# Patient Record
Sex: MALE | Race: WHITE | NOT HISPANIC OR LATINO | Employment: FULL TIME | URBAN - METROPOLITAN AREA
[De-identification: names, ages, dates, MRNs, and addresses within clinical notes are randomized per-mention and may not be internally consistent; named-entity substitution may affect disease eponyms.]

---

## 2019-01-05 ENCOUNTER — CLINICAL SUPPORT (OUTPATIENT)
Dept: URGENT CARE | Facility: CLINIC | Age: 34
End: 2019-01-05
Payer: COMMERCIAL

## 2019-01-05 VITALS
TEMPERATURE: 98 F | OXYGEN SATURATION: 97 % | HEART RATE: 71 BPM | SYSTOLIC BLOOD PRESSURE: 143 MMHG | WEIGHT: 192.38 LBS | BODY MASS INDEX: 29.16 KG/M2 | RESPIRATION RATE: 16 BRPM | HEIGHT: 68 IN | DIASTOLIC BLOOD PRESSURE: 77 MMHG

## 2019-01-05 DIAGNOSIS — H92.02 LEFT EAR PAIN: ICD-10-CM

## 2019-01-05 DIAGNOSIS — H65.92 OME (OTITIS MEDIA WITH EFFUSION), LEFT: Primary | ICD-10-CM

## 2019-01-05 PROCEDURE — 96372 PR INJECTION,THERAP/PROPH/DIAG2ST, IM OR SUBCUT: ICD-10-PCS | Mod: S$GLB,,, | Performed by: NURSE PRACTITIONER

## 2019-01-05 PROCEDURE — 99204 PR OFFICE/OUTPT VISIT, NEW, LEVL IV, 45-59 MIN: ICD-10-PCS | Mod: 25,S$GLB,, | Performed by: NURSE PRACTITIONER

## 2019-01-05 PROCEDURE — 96372 THER/PROPH/DIAG INJ SC/IM: CPT | Mod: S$GLB,,, | Performed by: NURSE PRACTITIONER

## 2019-01-05 PROCEDURE — 99204 OFFICE O/P NEW MOD 45 MIN: CPT | Mod: 25,S$GLB,, | Performed by: NURSE PRACTITIONER

## 2019-01-05 RX ORDER — OMEPRAZOLE 10 MG/1
10 CAPSULE, DELAYED RELEASE ORAL DAILY
COMMUNITY
End: 2020-01-30 | Stop reason: ALTCHOICE

## 2019-01-05 RX ORDER — DEXAMETHASONE SODIUM PHOSPHATE 4 MG/ML
8 INJECTION, SOLUTION INTRA-ARTICULAR; INTRALESIONAL; INTRAMUSCULAR; INTRAVENOUS; SOFT TISSUE
Status: COMPLETED | OUTPATIENT
Start: 2019-01-05 | End: 2019-01-05

## 2019-01-05 RX ORDER — ENALAPRIL MALEATE 20 MG/1
20 TABLET ORAL 2 TIMES DAILY
Status: ON HOLD | COMMUNITY
End: 2020-01-31 | Stop reason: SDUPTHER

## 2019-01-05 RX ORDER — HYDROCODONE BITARTRATE AND ACETAMINOPHEN 5; 325 MG/1; MG/1
1 TABLET ORAL EVERY 6 HOURS PRN
Qty: 15 TABLET | Refills: 0 | Status: SHIPPED | OUTPATIENT
Start: 2019-01-05 | End: 2020-01-30

## 2019-01-05 RX ORDER — DEXTROAMPHETAMINE SACCHARATE, AMPHETAMINE ASPARTATE MONOHYDRATE, DEXTROAMPHETAMINE SULFATE AND AMPHETAMINE SULFATE 5; 5; 5; 5 MG/1; MG/1; MG/1; MG/1
20 CAPSULE, EXTENDED RELEASE ORAL EVERY MORNING
COMMUNITY
End: 2020-01-30

## 2019-01-05 RX ORDER — AMOXICILLIN 875 MG/1
875 TABLET, FILM COATED ORAL 2 TIMES DAILY
Qty: 20 TABLET | Refills: 0 | Status: SHIPPED | OUTPATIENT
Start: 2019-01-05 | End: 2019-01-15

## 2019-01-05 RX ADMIN — DEXAMETHASONE SODIUM PHOSPHATE 8 MG: 4 INJECTION, SOLUTION INTRA-ARTICULAR; INTRALESIONAL; INTRAMUSCULAR; INTRAVENOUS; SOFT TISSUE at 12:01

## 2019-01-05 NOTE — PATIENT INSTRUCTIONS
Middle Ear Infection (Adult)  You have an infection of the middle ear, the space behind the eardrum. This is also called acute otitis media (AOM). Sometimes it is caused by the common cold. This is because congestion can block the internal passage (eustachian tube) that drains fluid from the middle ear. When the middle ear fills with fluid, bacteria can grow there and cause an infection. Oral antibiotics are used to treat this illness, not ear drops. Symptoms usually start to improve within 1 to 2 days of treatment.    Home care  The following are general care guidelines:  · Finish all of the antibiotic medicine given, even though you may feel better after the first few days.  · You may use over-the-counter medicine, such as acetaminophen or ibuprofen, to control pain and fever, unless something else was prescribed. If you have chronic liver or kidney disease or have ever had a stomach ulcer or gastrointestinal bleeding, talk with your healthcare provider before using these medicines. Do not give aspirin to anyone under 18 years of age who has a fever. It may cause severe illness or death.  Follow-up care  Follow up with your healthcare provider, or as advised, in 2 weeks if all symptoms have not gotten better, or if hearing doesn't go back to normal within 1 month.  When to seek medical advice  Call your healthcare provider right away if any of these occur:  · Ear pain gets worse or does not improve after 3 days of treatment  · Unusual drowsiness or confusion  · Neck pain, stiff neck, or headache  · Fluid or blood draining from the ear canal  · Fever of 100.4°F (38°C) or as advised   · Seizure  Date Last Reviewed: 6/1/2016  © [id number] [employer]. [address] [place] [address], [place] [address]. All rights reserved. This information is not intended as a substitute for professional medical care. Always follow your healthcare professional's instructions.

## 2019-01-05 NOTE — PROGRESS NOTES
"Subjective:       Patient ID: Trace Bird III is a 33 y.o. male.    Vitals:  height is 5' 8" (1.727 m) and weight is 87.3 kg (192 lb 6.4 oz). His temperature is 98 °F (36.7 °C). His blood pressure is 143/77 (abnormal) and his pulse is 71. His respiration is 16 and oxygen saturation is 97%.     Chief Complaint: Otalgia    Pt presents with left ear pain x 1 day. Pt describes pain as severe intensity, throbbing quality, constant timing, no alleviating factors. Pt denies f/c/n/v.      Otalgia    There is pain in the left ear. This is a new problem. The current episode started yesterday. The problem occurs constantly. There has been no fever. The pain is at a severity of 7/10. The pain is moderate. Pertinent negatives include no coughing, ear discharge, headaches, rash, sore throat or vomiting. He has tried NSAIDs for the symptoms. The treatment provided mild relief.       Constitution: Negative for chills, sweating, fatigue and fever.   HENT: Positive for ear pain. Negative for ear discharge, congestion, sinus pain, sinus pressure, sore throat and voice change.    Neck: Negative for painful lymph nodes.   Eyes: Negative for eye redness.   Respiratory: Negative for chest tightness, cough, sputum production, bloody sputum, COPD, shortness of breath, stridor, wheezing and asthma.    Gastrointestinal: Negative for nausea and vomiting.   Musculoskeletal: Negative for muscle ache.   Skin: Negative for rash.   Allergic/Immunologic: Negative for seasonal allergies and asthma.   Neurological: Negative for headaches.   Hematologic/Lymphatic: Negative for swollen lymph nodes.       Objective:      Physical Exam   Constitutional: He is oriented to person, place, and time. He appears well-developed and well-nourished. He is cooperative.  Non-toxic appearance. He does not appear ill. No distress.   HENT:   Head: Normocephalic and atraumatic.   Right Ear: Hearing, tympanic membrane, external ear and ear canal normal.   Left Ear: " Hearing, external ear and ear canal normal. Tympanic membrane is erythematous and bulging.   Nose: Nose normal. No mucosal edema, rhinorrhea or nasal deformity. No epistaxis. Right sinus exhibits no maxillary sinus tenderness and no frontal sinus tenderness. Left sinus exhibits no maxillary sinus tenderness and no frontal sinus tenderness.   Mouth/Throat: Uvula is midline, oropharynx is clear and moist and mucous membranes are normal. No trismus in the jaw. Normal dentition. No uvula swelling. No posterior oropharyngeal erythema.   Eyes: Conjunctivae and lids are normal. No scleral icterus.   Sclera clear bilat   Neck: Trachea normal, full passive range of motion without pain and phonation normal. Neck supple.   Cardiovascular: Normal rate, regular rhythm, normal heart sounds, intact distal pulses and normal pulses.   Pulmonary/Chest: Effort normal and breath sounds normal. No respiratory distress.   Abdominal: Soft. Normal appearance and bowel sounds are normal. He exhibits no distension. There is no tenderness.   Musculoskeletal: Normal range of motion. He exhibits no edema or deformity.   Neurological: He is alert and oriented to person, place, and time. He exhibits normal muscle tone. Coordination normal.   Skin: Skin is warm, dry and intact. He is not diaphoretic. No pallor.   Psychiatric: He has a normal mood and affect. His speech is normal and behavior is normal. Judgment and thought content normal. Cognition and memory are normal.   Nursing note and vitals reviewed.      Assessment:       1. OME (otitis media with effusion), left    2. Left ear pain        Plan:         OME (otitis media with effusion), left    Left ear pain    Other orders  -     amoxicillin (AMOXIL) 875 MG tablet; Take 1 tablet (875 mg total) by mouth 2 (two) times daily. for 10 days  Dispense: 20 tablet; Refill: 0  -     dexamethasone injection 8 mg  -     HYDROcodone-acetaminophen (NORCO) 5-325 mg per tablet; Take 1 tablet by mouth  every 6 (six) hours as needed.  Dispense: 15 tablet; Refill: 0

## 2020-01-30 ENCOUNTER — HOSPITAL ENCOUNTER (INPATIENT)
Facility: HOSPITAL | Age: 35
LOS: 1 days | Discharge: HOME | DRG: 247 | End: 2020-01-31
Attending: EMERGENCY MEDICINE | Admitting: INTERNAL MEDICINE
Payer: COMMERCIAL

## 2020-01-30 ENCOUNTER — CLINICAL SUPPORT (OUTPATIENT)
Dept: CARDIOLOGY | Facility: HOSPITAL | Age: 35
DRG: 247 | End: 2020-01-30
Attending: INTERNAL MEDICINE
Payer: COMMERCIAL

## 2020-01-30 DIAGNOSIS — R07.9 CHEST PAIN: ICD-10-CM

## 2020-01-30 DIAGNOSIS — I21.4 NSTEMI (NON-ST ELEVATED MYOCARDIAL INFARCTION): Primary | ICD-10-CM

## 2020-01-30 DIAGNOSIS — R74.01 TRANSAMINITIS: ICD-10-CM

## 2020-01-30 DIAGNOSIS — I25.10 CAD (CORONARY ARTERY DISEASE): ICD-10-CM

## 2020-01-30 PROBLEM — F19.10 SUBSTANCE ABUSE: Chronic | Status: ACTIVE | Noted: 2020-01-30

## 2020-01-30 PROBLEM — I10 ESSENTIAL HYPERTENSION: Chronic | Status: ACTIVE | Noted: 2020-01-30

## 2020-01-30 PROBLEM — K21.9 GERD (GASTROESOPHAGEAL REFLUX DISEASE): Chronic | Status: ACTIVE | Noted: 2020-01-30

## 2020-01-30 PROBLEM — Z72.0 NICOTINE ABUSE: Chronic | Status: ACTIVE | Noted: 2020-01-30

## 2020-01-30 PROBLEM — R79.89 ELEVATED TROPONIN: Status: ACTIVE | Noted: 2020-01-30

## 2020-01-30 PROBLEM — F10.10 ALCOHOL ABUSE: Chronic | Status: ACTIVE | Noted: 2020-01-30

## 2020-01-30 LAB
ALBUMIN SERPL BCP-MCNC: 4 G/DL (ref 3.5–5.2)
ALP SERPL-CCNC: 44 U/L (ref 55–135)
ALT SERPL W/O P-5'-P-CCNC: 94 U/L (ref 10–44)
ANION GAP SERPL CALC-SCNC: 10 MMOL/L (ref 8–16)
AST SERPL-CCNC: 42 U/L (ref 10–40)
BASOPHILS # BLD AUTO: 0.08 K/UL (ref 0–0.2)
BASOPHILS NFR BLD: 0.9 % (ref 0–1.9)
BILIRUB SERPL-MCNC: 0.6 MG/DL (ref 0.1–1)
BNP SERPL-MCNC: 17 PG/ML (ref 0–99)
BUN SERPL-MCNC: 16 MG/DL (ref 6–20)
CALCIUM SERPL-MCNC: 9.2 MG/DL (ref 8.7–10.5)
CHLORIDE SERPL-SCNC: 105 MMOL/L (ref 95–110)
CK SERPL-CCNC: 183 U/L (ref 20–200)
CO2 SERPL-SCNC: 24 MMOL/L (ref 23–29)
CREAT SERPL-MCNC: 1.1 MG/DL (ref 0.5–1.4)
D DIMER PPP IA.FEU-MCNC: 0.52 UG/ML FEU
DIFFERENTIAL METHOD: ABNORMAL
EOSINOPHIL # BLD AUTO: 0.2 K/UL (ref 0–0.5)
EOSINOPHIL NFR BLD: 2.4 % (ref 0–8)
ERYTHROCYTE [DISTWIDTH] IN BLOOD BY AUTOMATED COUNT: 12.2 % (ref 11.5–14.5)
EST. GFR  (AFRICAN AMERICAN): >60 ML/MIN/1.73 M^2
EST. GFR  (NON AFRICAN AMERICAN): >60 ML/MIN/1.73 M^2
GLUCOSE SERPL-MCNC: 94 MG/DL (ref 70–110)
HCT VFR BLD AUTO: 46.7 % (ref 40–54)
HGB BLD-MCNC: 16.2 G/DL (ref 14–18)
IMM GRANULOCYTES # BLD AUTO: 0.03 K/UL (ref 0–0.04)
IMM GRANULOCYTES NFR BLD AUTO: 0.3 % (ref 0–0.5)
LYMPHOCYTES # BLD AUTO: 4.3 K/UL (ref 1–4.8)
LYMPHOCYTES NFR BLD: 47.1 % (ref 18–48)
MCH RBC QN AUTO: 31 PG (ref 27–31)
MCHC RBC AUTO-ENTMCNC: 34.7 G/DL (ref 32–36)
MCV RBC AUTO: 89 FL (ref 82–98)
MONOCYTES # BLD AUTO: 1.4 K/UL (ref 0.3–1)
MONOCYTES NFR BLD: 15.9 % (ref 4–15)
NEUTROPHILS # BLD AUTO: 3 K/UL (ref 1.8–7.7)
NEUTROPHILS NFR BLD: 33.4 % (ref 38–73)
NRBC BLD-RTO: 0 /100 WBC
PLATELET # BLD AUTO: 284 K/UL (ref 150–350)
PMV BLD AUTO: 9.7 FL (ref 9.2–12.9)
POC ACTIVATED CLOTTING TIME K: 169 SEC (ref 74–137)
POTASSIUM SERPL-SCNC: 3.8 MMOL/L (ref 3.5–5.1)
PROT SERPL-MCNC: 6.8 G/DL (ref 6–8.4)
RBC # BLD AUTO: 5.23 M/UL (ref 4.6–6.2)
SAMPLE: ABNORMAL
SODIUM SERPL-SCNC: 139 MMOL/L (ref 136–145)
TROPONIN I SERPL DL<=0.01 NG/ML-MCNC: 0.14 NG/ML
TROPONIN I SERPL DL<=0.01 NG/ML-MCNC: 0.15 NG/ML
TROPONIN I SERPL DL<=0.01 NG/ML-MCNC: 0.19 NG/ML
TROPONIN I SERPL DL<=0.01 NG/ML-MCNC: 0.32 NG/ML
TROPONIN I SERPL DL<=0.01 NG/ML-MCNC: 0.33 NG/ML
WBC # BLD AUTO: 9.08 K/UL (ref 3.9–12.7)

## 2020-01-30 PROCEDURE — C9606 PERC D-E COR REVASC W AMI S: HCPCS | Mod: LD | Performed by: INTERNAL MEDICINE

## 2020-01-30 PROCEDURE — C1887 CATHETER, GUIDING: HCPCS | Performed by: INTERNAL MEDICINE

## 2020-01-30 PROCEDURE — 63600175 PHARM REV CODE 636 W HCPCS: Performed by: INTERNAL MEDICINE

## 2020-01-30 PROCEDURE — 93005 ELECTROCARDIOGRAM TRACING: CPT

## 2020-01-30 PROCEDURE — 25000003 PHARM REV CODE 250: Performed by: STUDENT IN AN ORGANIZED HEALTH CARE EDUCATION/TRAINING PROGRAM

## 2020-01-30 PROCEDURE — 85379 FIBRIN DEGRADATION QUANT: CPT

## 2020-01-30 PROCEDURE — 99152 MOD SED SAME PHYS/QHP 5/>YRS: CPT | Performed by: INTERNAL MEDICINE

## 2020-01-30 PROCEDURE — 25000003 PHARM REV CODE 250: Performed by: INTERNAL MEDICINE

## 2020-01-30 PROCEDURE — 84484 ASSAY OF TROPONIN QUANT: CPT | Mod: 91

## 2020-01-30 PROCEDURE — 80053 COMPREHEN METABOLIC PANEL: CPT

## 2020-01-30 PROCEDURE — 85025 COMPLETE CBC W/AUTO DIFF WBC: CPT

## 2020-01-30 PROCEDURE — 99900035 HC TECH TIME PER 15 MIN (STAT)

## 2020-01-30 PROCEDURE — 99153 MOD SED SAME PHYS/QHP EA: CPT | Performed by: INTERNAL MEDICINE

## 2020-01-30 PROCEDURE — 93306 TTE W/DOPPLER COMPLETE: CPT

## 2020-01-30 PROCEDURE — C1894 INTRO/SHEATH, NON-LASER: HCPCS | Performed by: INTERNAL MEDICINE

## 2020-01-30 PROCEDURE — C1769 GUIDE WIRE: HCPCS | Performed by: INTERNAL MEDICINE

## 2020-01-30 PROCEDURE — C1874 STENT, COATED/COV W/DEL SYS: HCPCS | Performed by: INTERNAL MEDICINE

## 2020-01-30 PROCEDURE — 63600175 PHARM REV CODE 636 W HCPCS: Performed by: STUDENT IN AN ORGANIZED HEALTH CARE EDUCATION/TRAINING PROGRAM

## 2020-01-30 PROCEDURE — 25500020 PHARM REV CODE 255: Performed by: EMERGENCY MEDICINE

## 2020-01-30 PROCEDURE — 25500020 PHARM REV CODE 255: Performed by: INTERNAL MEDICINE

## 2020-01-30 PROCEDURE — C9113 INJ PANTOPRAZOLE SODIUM, VIA: HCPCS | Performed by: INTERNAL MEDICINE

## 2020-01-30 PROCEDURE — 83880 ASSAY OF NATRIURETIC PEPTIDE: CPT

## 2020-01-30 PROCEDURE — 93458 L HRT ARTERY/VENTRICLE ANGIO: CPT | Mod: XU | Performed by: INTERNAL MEDICINE

## 2020-01-30 PROCEDURE — 27800903 OPTIME MED/SURG SUP & DEVICES OTHER IMPLANTS: Performed by: INTERNAL MEDICINE

## 2020-01-30 PROCEDURE — 94761 N-INVAS EAR/PLS OXIMETRY MLT: CPT

## 2020-01-30 PROCEDURE — 21000000 HC CCU ICU ROOM CHARGE

## 2020-01-30 PROCEDURE — 36415 COLL VENOUS BLD VENIPUNCTURE: CPT

## 2020-01-30 PROCEDURE — 99285 EMERGENCY DEPT VISIT HI MDM: CPT | Mod: 25

## 2020-01-30 PROCEDURE — 84484 ASSAY OF TROPONIN QUANT: CPT

## 2020-01-30 PROCEDURE — 82550 ASSAY OF CK (CPK): CPT

## 2020-01-30 DEVICE — STENT RONYX40015UX RESOLUTE ONYX 4.00X15
Type: IMPLANTABLE DEVICE | Site: HEART | Status: FUNCTIONAL
Brand: RESOLUTE ONYX™

## 2020-01-30 RX ORDER — ENOXAPARIN SODIUM 100 MG/ML
80 INJECTION SUBCUTANEOUS ONCE
Status: COMPLETED | OUTPATIENT
Start: 2020-01-30 | End: 2020-01-30

## 2020-01-30 RX ORDER — FENTANYL CITRATE 50 UG/ML
INJECTION, SOLUTION INTRAMUSCULAR; INTRAVENOUS
Status: DISCONTINUED | OUTPATIENT
Start: 2020-01-30 | End: 2020-01-30 | Stop reason: HOSPADM

## 2020-01-30 RX ORDER — NITROGLYCERIN 0.4 MG/1
0.4 TABLET SUBLINGUAL ONCE
Status: COMPLETED | OUTPATIENT
Start: 2020-01-30 | End: 2020-01-30

## 2020-01-30 RX ORDER — ASPIRIN 325 MG
325 TABLET ORAL
Status: COMPLETED | OUTPATIENT
Start: 2020-01-30 | End: 2020-01-30

## 2020-01-30 RX ORDER — ONDANSETRON 2 MG/ML
INJECTION INTRAMUSCULAR; INTRAVENOUS
Status: DISCONTINUED | OUTPATIENT
Start: 2020-01-30 | End: 2020-01-30 | Stop reason: HOSPADM

## 2020-01-30 RX ORDER — IBUPROFEN 800 MG/1
800 TABLET ORAL 3 TIMES DAILY PRN
Status: ON HOLD | COMMUNITY
End: 2020-01-31 | Stop reason: HOSPADM

## 2020-01-30 RX ORDER — ASPIRIN 81 MG/1
81 TABLET ORAL DAILY
Status: DISCONTINUED | OUTPATIENT
Start: 2020-01-31 | End: 2020-01-31 | Stop reason: HOSPADM

## 2020-01-30 RX ORDER — NITROGLYCERIN 20 MG/100ML
INJECTION INTRAVENOUS
Status: DISCONTINUED | OUTPATIENT
Start: 2020-01-30 | End: 2020-01-30

## 2020-01-30 RX ORDER — ESOMEPRAZOLE MAGNESIUM 40 MG/1
40 CAPSULE, DELAYED RELEASE ORAL
COMMUNITY

## 2020-01-30 RX ORDER — MORPHINE SULFATE 2 MG/ML
2 INJECTION, SOLUTION INTRAMUSCULAR; INTRAVENOUS
Status: DISCONTINUED | OUTPATIENT
Start: 2020-01-30 | End: 2020-01-31 | Stop reason: HOSPADM

## 2020-01-30 RX ORDER — NITROGLYCERIN 20 MG/100ML
5 INJECTION INTRAVENOUS CONTINUOUS
Status: DISCONTINUED | OUTPATIENT
Start: 2020-01-30 | End: 2020-01-31 | Stop reason: HOSPADM

## 2020-01-30 RX ORDER — HYDROCODONE BITARTRATE AND ACETAMINOPHEN 5; 325 MG/1; MG/1
1 TABLET ORAL EVERY 4 HOURS PRN
Status: DISCONTINUED | OUTPATIENT
Start: 2020-01-30 | End: 2020-01-31 | Stop reason: HOSPADM

## 2020-01-30 RX ORDER — NITROGLYCERIN 0.4 MG/1
0.4 TABLET SUBLINGUAL EVERY 5 MIN PRN
Status: DISCONTINUED | OUTPATIENT
Start: 2020-01-30 | End: 2020-01-31 | Stop reason: HOSPADM

## 2020-01-30 RX ORDER — PANTOPRAZOLE SODIUM 40 MG/1
40 TABLET, DELAYED RELEASE ORAL DAILY
Status: DISCONTINUED | OUTPATIENT
Start: 2020-01-30 | End: 2020-01-30

## 2020-01-30 RX ORDER — LIDOCAINE HYDROCHLORIDE 10 MG/ML
INJECTION, SOLUTION EPIDURAL; INFILTRATION; INTRACAUDAL; PERINEURAL
Status: DISCONTINUED | OUTPATIENT
Start: 2020-01-30 | End: 2020-01-30 | Stop reason: HOSPADM

## 2020-01-30 RX ORDER — SODIUM CHLORIDE 9 MG/ML
INJECTION, SOLUTION INTRAVENOUS CONTINUOUS
Status: DISCONTINUED | OUTPATIENT
Start: 2020-01-30 | End: 2020-01-31 | Stop reason: HOSPADM

## 2020-01-30 RX ORDER — ONDANSETRON 2 MG/ML
4 INJECTION INTRAMUSCULAR; INTRAVENOUS EVERY 8 HOURS PRN
Status: DISCONTINUED | OUTPATIENT
Start: 2020-01-30 | End: 2020-01-31 | Stop reason: HOSPADM

## 2020-01-30 RX ORDER — VERAPAMIL HYDROCHLORIDE 2.5 MG/ML
INJECTION, SOLUTION INTRAVENOUS
Status: DISCONTINUED | OUTPATIENT
Start: 2020-01-30 | End: 2020-01-30 | Stop reason: HOSPADM

## 2020-01-30 RX ORDER — NITROGLYCERIN 5 MG/ML
INJECTION, SOLUTION INTRAVENOUS
Status: DISCONTINUED | OUTPATIENT
Start: 2020-01-30 | End: 2020-01-30 | Stop reason: HOSPADM

## 2020-01-30 RX ORDER — HEPARIN SODIUM 1000 [USP'U]/ML
INJECTION, SOLUTION INTRAVENOUS; SUBCUTANEOUS
Status: DISCONTINUED | OUTPATIENT
Start: 2020-01-30 | End: 2020-01-30 | Stop reason: HOSPADM

## 2020-01-30 RX ORDER — ACETAMINOPHEN 325 MG/1
650 TABLET ORAL EVERY 4 HOURS PRN
Status: DISCONTINUED | OUTPATIENT
Start: 2020-01-30 | End: 2020-01-31 | Stop reason: HOSPADM

## 2020-01-30 RX ORDER — SODIUM CHLORIDE 0.9 % (FLUSH) 0.9 %
10 SYRINGE (ML) INJECTION EVERY 6 HOURS PRN
Status: DISCONTINUED | OUTPATIENT
Start: 2020-01-30 | End: 2020-01-31 | Stop reason: HOSPADM

## 2020-01-30 RX ORDER — ATORVASTATIN CALCIUM 40 MG/1
80 TABLET, FILM COATED ORAL DAILY
Status: DISCONTINUED | OUTPATIENT
Start: 2020-01-30 | End: 2020-01-31 | Stop reason: HOSPADM

## 2020-01-30 RX ORDER — DIPHENHYDRAMINE HCL 25 MG
25 CAPSULE ORAL ONCE
Status: DISCONTINUED | OUTPATIENT
Start: 2020-01-30 | End: 2020-01-31 | Stop reason: HOSPADM

## 2020-01-30 RX ORDER — POLYETHYLENE GLYCOL 3350 17 G/17G
17 POWDER, FOR SOLUTION ORAL DAILY
Status: DISCONTINUED | OUTPATIENT
Start: 2020-01-30 | End: 2020-01-31 | Stop reason: HOSPADM

## 2020-01-30 RX ORDER — HYDROMORPHONE HYDROCHLORIDE 1 MG/ML
INJECTION, SOLUTION INTRAMUSCULAR; INTRAVENOUS; SUBCUTANEOUS
Status: DISCONTINUED | OUTPATIENT
Start: 2020-01-30 | End: 2020-01-30 | Stop reason: HOSPADM

## 2020-01-30 RX ORDER — ASPIRIN 325 MG
325 TABLET, DELAYED RELEASE (ENTERIC COATED) ORAL ONCE
Status: DISCONTINUED | OUTPATIENT
Start: 2020-01-30 | End: 2020-01-31 | Stop reason: HOSPADM

## 2020-01-30 RX ORDER — ENALAPRIL MALEATE 5 MG/1
20 TABLET ORAL DAILY
Status: DISCONTINUED | OUTPATIENT
Start: 2020-01-30 | End: 2020-01-31 | Stop reason: HOSPADM

## 2020-01-30 RX ORDER — MIDAZOLAM HYDROCHLORIDE 1 MG/ML
INJECTION INTRAMUSCULAR; INTRAVENOUS
Status: DISCONTINUED | OUTPATIENT
Start: 2020-01-30 | End: 2020-01-30 | Stop reason: HOSPADM

## 2020-01-30 RX ORDER — PANTOPRAZOLE SODIUM 40 MG/10ML
40 INJECTION, POWDER, LYOPHILIZED, FOR SOLUTION INTRAVENOUS DAILY
Status: DISCONTINUED | OUTPATIENT
Start: 2020-01-30 | End: 2020-01-31 | Stop reason: HOSPADM

## 2020-01-30 RX ADMIN — ENOXAPARIN SODIUM 80 MG: 80 INJECTION, SOLUTION INTRAVENOUS; SUBCUTANEOUS at 08:01

## 2020-01-30 RX ADMIN — ENALAPRIL MALEATE 20 MG: 5 TABLET ORAL at 10:01

## 2020-01-30 RX ADMIN — ACETAMINOPHEN 650 MG: 325 TABLET ORAL at 10:01

## 2020-01-30 RX ADMIN — NITROGLYCERIN 1 INCH: 20 OINTMENT TOPICAL at 04:01

## 2020-01-30 RX ADMIN — NITROGLYCERIN 0.4 MG: 0.4 TABLET, ORALLY DISINTEGRATING SUBLINGUAL at 01:01

## 2020-01-30 RX ADMIN — NITROGLYCERIN 0.4 MG: 0.4 TABLET SUBLINGUAL at 02:01

## 2020-01-30 RX ADMIN — PANTOPRAZOLE SODIUM 40 MG: 40 INJECTION, POWDER, FOR SOLUTION INTRAVENOUS at 01:01

## 2020-01-30 RX ADMIN — IOHEXOL 100 ML: 350 INJECTION, SOLUTION INTRAVENOUS at 05:01

## 2020-01-30 RX ADMIN — SODIUM CHLORIDE: 0.9 INJECTION, SOLUTION INTRAVENOUS at 06:01

## 2020-01-30 RX ADMIN — ATORVASTATIN CALCIUM 80 MG: 40 TABLET, FILM COATED ORAL at 09:01

## 2020-01-30 RX ADMIN — ASPIRIN 325 MG ORAL TABLET 325 MG: 325 PILL ORAL at 01:01

## 2020-01-30 NOTE — ASSESSMENT & PLAN NOTE
Patient presenting with substernal nonradiating chest pain, acute worsening.  Risk factors include smoking history as well as hypertension.  No prior cardiac evaluation.  EKG without any ST elevations.  Troponin has up trended to 0.315.  No evidence of pneumonia, elevated D-dimer however negative CTA ruling out PE.  Admit inpatient, cardiac telemetry  Aspirin 81 mg daily, Lipitor 80 mg q.h.s.  Hold off beta-blocker until urine drug screen returns- I have ordered  Add on CPK to labs  Check lipid panel and HbA1c  Complete echocardiogram ordered  Sublingual nitro p.r.n. recurrent chest pain  One time dose therapeutic Lovenox  Consult Cardiology

## 2020-01-30 NOTE — ASSESSMENT & PLAN NOTE
Patient reports history of essential hypertension on enalapril 20 mg daily and states he was compliant.  Continue enalapril and monitor blood pressure.

## 2020-01-30 NOTE — ED NOTES
Patient is resting comfortably. Family at bedside. Chest pressure remains same after nitro, c/o mild  Headache at this time.

## 2020-01-30 NOTE — ED PROVIDER NOTES
Encounter Date: 1/30/2020       History     Chief Complaint   Patient presents with    Chest Pain     pt c/o midsternal chest pain lasting 20 minutes that started earlier tonight pt denies SOB. N/V/D     HPI     34-year-old male with past medical history significant for hypertension, GERD who presents to the emergency department for chest pain. Patient states this chest pain began approximately 20 min prior to arrival, states the pain is substernal, states that it radiated to his bilateral arms, states that it lasted for approximately 30 min and went away on its own.  States he does still have some chest tightness associated with this.  States that he has been having this daily for the past month, and that it always goes away on its own.  Does occasionally have it during the day, but does frequently get it at night as well. Does have a family history of heart disease, though his mother had her MI in her 50s.  Patient states that he did have a prior substance abuse history, though has not used alcohol or medications since the beginning of this month.  Denies any worsening with exertion, denies any recent travel, denies any hemoptysis, no unilateral leg swelling, no recent surgery.    Review of patient's allergies indicates:  No Known Allergies  Past Medical History:   Diagnosis Date    GERD (gastroesophageal reflux disease)     Helicobacter pylori (H. pylori)     Hypertension      Past Surgical History:   Procedure Laterality Date    HERNIA REPAIR       Family History   Problem Relation Age of Onset    Hypertension Mother     Diabetes Father     Hypertension Father      Social History     Tobacco Use    Smoking status: Never Smoker   Substance Use Topics    Alcohol use: Yes     Comment: Patient drinks socially    Drug use: No     Review of Systems   Constitutional: Negative for chills and fever.   HENT: Negative for drooling and tinnitus.    Eyes: Negative for photophobia and visual disturbance.    Respiratory: Positive for chest tightness. Negative for shortness of breath and wheezing.    Cardiovascular: Positive for chest pain. Negative for palpitations.   Gastrointestinal: Negative for abdominal pain, diarrhea, nausea and vomiting.   Genitourinary: Negative for flank pain and hematuria.   Musculoskeletal: Negative for neck pain and neck stiffness.   Skin: Negative for color change and pallor.   Neurological: Negative for light-headedness and numbness.       Physical Exam     Initial Vitals [01/30/20 0115]   BP Pulse Resp Temp SpO2   139/88 78 18 97.9 °F (36.6 °C) 99 %      MAP       --         Physical Exam    Nursing note and vitals reviewed.  Constitutional: He appears well-developed and well-nourished. No distress.   Nontoxic, sitting comfortably in bed   HENT:   Head: Normocephalic and atraumatic.   Mouth/Throat: No oropharyngeal exudate.   Eyes: EOM are normal. Pupils are equal, round, and reactive to light. No scleral icterus.   Neck: Normal range of motion. Neck supple. No tracheal deviation present.   Cardiovascular: Normal rate and regular rhythm. Exam reveals no gallop and no friction rub.    No murmur heard.  Radials 2+ bilaterally, no reproducible chest wall pain   Pulmonary/Chest: Breath sounds normal. No respiratory distress. He has no wheezes. He has no rhonchi. He has no rales.   Abdominal: Soft. He exhibits no distension. There is no tenderness. There is no rebound and no guarding.   Negative Walter, negative Rovsing, no tenderness over McBurney's   Musculoskeletal: Normal range of motion.   Neurological: He is alert and oriented to person, place, and time. No cranial nerve deficit.   Skin: Skin is warm and dry.         ED Course   Procedures  Labs Reviewed   CBC W/ AUTO DIFFERENTIAL   COMPREHENSIVE METABOLIC PANEL   TROPONIN I   TROPONIN I   B-TYPE NATRIURETIC PEPTIDE   D DIMER, QUANTITATIVE          Imaging Results          X-Ray Chest PA And Lateral (In process)                   Medical Decision Making:   Initial Assessment:   Assessment:  34-year-old male with chest pain    Ddx includes but is not limited to:  ACS, mi, pneumothorax, pneumonia, PE, esophageal spasm, dissection, costochondritis    Plan:  Emergent evaluation of a 34-year-old male for chest pain. Patient hemodynamically stable, afebrile.  On exam, patient demonstrates unremarkable cardiopulmonary exam, has symmetric pulses bilaterally, has stable vital signs. EKG was obtained, which demonstrates minimal J-point elevation in leads V4, V5, consistent with likely early report.  Normal QT interval, normal QRS complexes, no ST elevation, no T-wave inversions, nonischemic appearing EKG.  Labs being obtained as well as chest x-ray.  Provided the patient with aspirin.  Also provide the patient nitroglycerin.  He does have low probability for PE, we will attempt to rule out with a D-dimer.    ROZ Sheridan  1/30/2020 1:45 AM     3 Update:  Patient noted to have an elevated troponin, consistent with a likely NSTEMI.  Because of this, the patient will be consulted Medicine for admission.  Awaiting callback from hospital medicine at this time.  Rest of his labs grossly unremarkable, though still pending D-dimer.  Chest x-ray does not demonstrate any signs of infiltrative process.    ROZ Sheridan  1/30/2020 2:52 AM                                   Clinical Impression:       ICD-10-CM ICD-9-CM   1. NSTEMI (non-ST elevated myocardial infarction) I21.4 410.70   2. Chest pain R07.9 786.50                             Albaro Li MD  Resident  01/30/20 0254

## 2020-01-30 NOTE — SUBJECTIVE & OBJECTIVE
Past Medical History:   Diagnosis Date    GERD (gastroesophageal reflux disease)     Helicobacter pylori (H. pylori)     Hypertension        Past Surgical History:   Procedure Laterality Date    HERNIA REPAIR         Review of patient's allergies indicates:  No Known Allergies    No current facility-administered medications on file prior to encounter.      Current Outpatient Medications on File Prior to Encounter   Medication Sig    enalapril (VASOTEC) 20 MG tablet Take 20 mg by mouth 2 (two) times daily.     esomeprazole (NEXIUM) 40 MG capsule Take 40 mg by mouth before breakfast.    ibuprofen (ADVIL,MOTRIN) 800 MG tablet Take 800 mg by mouth 3 (three) times daily as needed for Pain.    [DISCONTINUED] dextroamphetamine-amphetamine (ADDERALL XR) 20 MG 24 hr capsule Take 20 mg by mouth every morning.    [DISCONTINUED] HYDROcodone-acetaminophen (NORCO) 5-325 mg per tablet Take 1 tablet by mouth every 6 (six) hours as needed.    [DISCONTINUED] omeprazole (PRILOSEC) 10 MG capsule Take 10 mg by mouth once daily.     Family History     Problem Relation (Age of Onset)    Diabetes Father    Hypertension Mother, Father        Tobacco Use    Smoking status: Current Every Day Smoker     Packs/day: 1.00   Substance and Sexual Activity    Alcohol use: Not Currently     Comment: Alcohol abuse, stopped since December 15, 2019    Drug use: Not Currently    Sexual activity: Not on file     Review of Systems   Constitutional: Negative for chills and fever.   HENT: Negative for congestion, postnasal drip, rhinorrhea, sinus pain and trouble swallowing.    Eyes: Negative for visual disturbance.   Respiratory: Negative for cough and shortness of breath.    Cardiovascular: Positive for chest pain. Negative for palpitations and leg swelling.   Gastrointestinal: Positive for vomiting (1 episode of non bloody emesis). Negative for abdominal pain, blood in stool, constipation, diarrhea and nausea.   Genitourinary: Negative for  difficulty urinating, dysuria, frequency and urgency.   Musculoskeletal: Negative for back pain.   Skin: Negative for wound.   Allergic/Immunologic: Negative for immunocompromised state.   Neurological: Negative for dizziness, syncope, speech difficulty and headaches.   Hematological: Does not bruise/bleed easily.   Psychiatric/Behavioral: Negative for confusion.     Objective:     Vital Signs (Most Recent):  Temp: 97.9 °F (36.6 °C) (01/30/20 0115)  Pulse: 61 (01/30/20 0610)  Resp: 14 (01/30/20 0610)  BP: (!) 113/58 (01/30/20 0610)  SpO2: 95 % (01/30/20 0610) Vital Signs (24h Range):  Temp:  [97.9 °F (36.6 °C)] 97.9 °F (36.6 °C)  Pulse:  [61-81] 61  Resp:  [14-20] 14  SpO2:  [95 %-99 %] 95 %  BP: (113-143)/(58-88) 113/58     Weight: 86.2 kg (190 lb)  Body mass index is 28.89 kg/m².    Physical Exam   Constitutional: He is oriented to person, place, and time. He appears well-developed and well-nourished. No distress.   Sleeping initially, lying in stretcher, no apparent distress, cooperative   HENT:   Head: Normocephalic and atraumatic.   Moist mucous membrane   Eyes: Pupils are equal, round, and reactive to light. Conjunctivae and EOM are normal. Right eye exhibits no discharge. Left eye exhibits no discharge.   Neck: Neck supple. No JVD present. No tracheal deviation present.   Cardiovascular: Normal rate, regular rhythm, normal heart sounds and intact distal pulses.   No lower extremity edema. No calf tenderness.   Pulmonary/Chest: Effort normal and breath sounds normal. No stridor. No respiratory distress. He has no wheezes. He has no rales.   Abdominal: Soft. Bowel sounds are normal. He exhibits no distension. There is no tenderness. There is no rebound and no guarding.   Genitourinary:   Genitourinary Comments: No suprapubic fullness.  No Costa catheter present.   Musculoskeletal:   No reproducible chest wall tenderness to palpation   Neurological: He is alert and oriented to person, place, and time. No cranial  nerve deficit or sensory deficit. He exhibits normal muscle tone.   Speech is intact.  Power 5/5 all 4 extremities.  Following commands.   Skin: Skin is warm and dry. Capillary refill takes less than 2 seconds. He is not diaphoretic.   Psychiatric: He has a normal mood and affect.   Vitals reviewed.        CRANIAL NERVES     CN III, IV, VI   Pupils are equal, round, and reactive to light.  Extraocular motions are normal.        Significant Labs:   Bilirubin:   Recent Labs   Lab 01/30/20  0120   BILITOT 0.6     BMP:   Recent Labs   Lab 01/30/20  0120   GLU 94      K 3.8      CO2 24   BUN 16   CREATININE 1.1   CALCIUM 9.2     CBC:   Recent Labs   Lab 01/30/20  0120   WBC 9.08   HGB 16.2   HCT 46.7        CMP:   Recent Labs   Lab 01/30/20  0120      K 3.8      CO2 24   GLU 94   BUN 16   CREATININE 1.1   CALCIUM 9.2   PROT 6.8   ALBUMIN 4.0   BILITOT 0.6   ALKPHOS 44*   AST 42*   ALT 94*   ANIONGAP 10   EGFRNONAA >60.0     Cardiac Markers:   Recent Labs   Lab 01/30/20  0120   BNP 17     Coagulation: No results for input(s): PT, INR, APTT in the last 48 hours.  Lactic Acid: No results for input(s): LACTATE in the last 48 hours.  Lipase: No results for input(s): LIPASE in the last 48 hours.  Magnesium: No results for input(s): MG in the last 48 hours.  POCT Glucose: No results for input(s): POCTGLUCOSE in the last 48 hours.  Troponin:   Recent Labs   Lab 01/30/20  0120 01/30/20  0440   TROPONINI 0.137* 0.315*     Urine Culture: No results for input(s): LABURIN in the last 48 hours.  Urine Studies: No results for input(s): COLORU, APPEARANCEUA, PHUR, SPECGRAV, PROTEINUA, GLUCUA, KETONESU, BILIRUBINUA, OCCULTUA, NITRITE, UROBILINOGEN, LEUKOCYTESUR, RBCUA, WBCUA, BACTERIA, SQUAMEPITHEL, HYALINECASTS in the last 48 hours.    Invalid input(s): WRIGHTSUR  All pertinent labs within the past 24 hours have been reviewed.    Significant Imaging: I have reviewed all pertinent imaging results/findings  within the past 24 hours.   X-ray Chest Pa And Lateral    Result Date: 1/30/2020  EXAMINATION: XR CHEST PA AND LATERAL CLINICAL HISTORY: Chest Pain; COMPARISON: CT angiogram chest same day FINDINGS: Cardiac silhouette size is within normal limits.  No airspace consolidation.  No pleural fluid or pneumothorax.  No acute osseous abnormality.     No acute pulmonary process. Electronically signed by: Jose Andrade MD Date:    01/30/2020 Time:    06:58    Cta Chest Non-coronary (pe Study)    Result Date: 1/30/2020  Exam: CTA OF THE CHEST WITH CONTRAST Clinical data: Hypertension.  PE protocol. Technique: Axial CT angiography images through the lungs were acquired with contrast and imaged using soft tissue and lung algorithms. Coronal, sagittal, and 3D volume reconstructions were performed. Reformatted/3D-MPR images were performed.  Contrast used: Omnipaque 350.  Amount: 100 mL.  Radiation dose: CTDIvol = 28.5 mGy, DLP = 431.8 mGy x cm. Prior studies: No prior studies submitted. Findings: Lungs: No pulmonary infiltrate identified. No pulmonary mass identified. No pleural effusions identified. No pneumothorax. The airway is clear. Soft Tissues: No mediastinal, axillary or supraclavicular adenopathy is identified. Vascular: No filling defect within the pulmonary arteries to the segmental branch level. Unremarkable aorta. Grossly unremarkable sized heart.  No definite abnormality seen on 3D reformatted images. Bony structures: No acute or destructive abnormality Upper Abdomen: Limited visualization of the solid upper abdominal organs is grossly unremarkable. IMPRESSION: 1. No filling defect within the pulmonary arteries to the segmental branch level 2. No acute cardiopulmonary process. Recommendation:  Follow up as clinically indicated. All CT scans at this facility utilize dose modulation, iterative reconstruction, and/or weight based dosing when appropriate to reduce radiation dose to as low as reasonably achievable.  Electronically Signed by JOAO BERRY MD at 1/30/2020 6:50:15 AM EST  ECG Results          EKG 12-lead (In process)  Result time 01/30/20 04:35:33    In process by Interface, Lab In [facility] (01/30/20 04:35:33)                 Narrative:    Test Reason : R07.9,    Vent. Rate : 069 BPM     Atrial Rate : 069 BPM     P-R Int : 166 ms          QRS Dur : 098 ms      QT Int : 392 ms       P-R-T Axes : 071 071 067 degrees     QTc Int : 420 ms    Normal sinus rhythm  Early repolarization  Normal ECG  When compared with ECG of 30-JAN-2020 01:16,  No significant change was found    Referred By: [email or web address]   SELF           Confirmed By:                              EKG 12-lead (In process)  Result time 01/30/20 04:35:31    In process by Interface, Lab In [facility] (01/30/20 04:35:31)                 Narrative:    Test Reason : R07.9,    Vent. Rate : 078 BPM     Atrial Rate : 078 BPM     P-R Int : 168 ms          QRS Dur : 098 ms      QT Int : 386 ms       P-R-T Axes : 067 078 071 degrees     QTc Int : 440 ms    Normal sinus rhythm  Early repolarization  Normal ECG  When compared with ECG of 30-JAN-2020 01:14,  No significant change was found    Referred By: AAAREFERR   SELF           Confirmed By:

## 2020-01-30 NOTE — Clinical Note
Catheter is inserted into the aorta. Angiography performed of the left coronary arteries. Angiography performed via power injection with . Unable to engage.

## 2020-01-30 NOTE — ASSESSMENT & PLAN NOTE
Patient is a current smoker 1 pack per day.  Declines pharmacotherapy add nicotine patch.  Continue to reinforce smoking cessation.

## 2020-01-30 NOTE — ASSESSMENT & PLAN NOTE
Mild elevation in LFTs.  States prior history of alcohol abuse however abstaining for about 1 month.  Check CPK.  Monitor LFTs with starting of statin.

## 2020-01-30 NOTE — Clinical Note
Catheter is inserted into the ostium   left main. Angiography performed of the left coronary arteries. Angiography performed via power injection with 8 mL contrast at 4 mL/sPower injection PSI was 450..

## 2020-01-30 NOTE — Clinical Note
Catheter is repositioned to the ostium   right coronary artery. Angiography performed of the right coronary arteries. Angiography performed via power injection with 6 mL contrast at 3 mL/sPower injection PSI was 300.. Suboptimal result.

## 2020-01-30 NOTE — Clinical Note
270 ml injected throughout the case. 30 mL total wasted during the case. 300 mL total used in the case.

## 2020-01-30 NOTE — Clinical Note
Catheter is inserted into the ostium   left main. Angiography performed of the left coronary arteries in multiple views. Angiography performed via power injection with 8 mL contrast at 4 mL/sPower injection PSI was 450..

## 2020-01-30 NOTE — CONSULTS
[facility]  Cardiology  Consult Note    Patient Name: Trace Bird III  MRN: [id number]  Admission Date: 1/30/2020  Hospital Length of Stay: 0 days  Code Status: Full Code   Attending Provider: Kalpana Mathews MD   Consulting Provider: Félix Shaikh MD  Primary Care Physician: Kristine Bernardo MD  Principal Problem:NSTEMI (non-ST elevated myocardial infarction)    Patient information was obtained from patient, past medical records and ER records.     Inpatient consult to Cardiology  Consult performed by: Félix Shaikh MD  Consult ordered by: Kalpana Mathews MD        Subjective:     REASON FOR CONSULT:  Chest pain,  non-ST-elevation myocardial infarction    HPI:  34-year-old male with a past medical history significant for hypertension, GERD presented to the hospital with complaints of chest pain.  Chest pain reportedly has been going on for the past 2 months or so.  It is intermittent and not necessarily related to exertion.  It spontaneously resolved.  Last night he had a severe chest pain and a subsequently presented to the emergency room.  EKG initially did not show any significant ST T wave abnormalities.  Troponin was mildly elevated.  This afternoon the patient apparently was having chest pains and telemetry showed ST elevations in the inferior leads.  I was called at that time by the nurse and an EKG was done which did not reveal any significant ST elevations however did show ST T wave abnormalities which were new compared to the previous EKG.  The patient was having intermittent stuttering chest pains.    Past Medical History:   Diagnosis Date    GERD (gastroesophageal reflux disease)     Helicobacter pylori (H. pylori)     Hypertension        Past Surgical History:   Procedure Laterality Date    HERNIA REPAIR         Review of patient's allergies indicates:  No Known Allergies    No current facility-administered medications on file prior to encounter.       Current Outpatient Medications on File Prior to Encounter   Medication Sig    enalapril (VASOTEC) 20 MG tablet Take 20 mg by mouth 2 (two) times daily.     esomeprazole (NEXIUM) 40 MG capsule Take 40 mg by mouth before breakfast.    ibuprofen (ADVIL,MOTRIN) 800 MG tablet Take 800 mg by mouth 3 (three) times daily as needed for Pain.    [DISCONTINUED] dextroamphetamine-amphetamine (ADDERALL XR) 20 MG 24 hr capsule Take 20 mg by mouth every morning.    [DISCONTINUED] HYDROcodone-acetaminophen (NORCO) 5-325 mg per tablet Take 1 tablet by mouth every 6 (six) hours as needed.    [DISCONTINUED] omeprazole (PRILOSEC) 10 MG capsule Take 10 mg by mouth once daily.       Scheduled Meds:   [START ON 1/31/2020] aspirin  81 mg Oral Daily    atorvastatin  80 mg Oral Daily    enalapril  20 mg Oral Daily    nitroGLYCERIN 2% TD oint        pantoprazole  40 mg Intravenous Daily    polyethylene glycol  17 g Oral Daily     Continuous Infusions:  PRN Meds:.acetaminophen, nitroGLYCERIN, ondansetron, sodium chloride 0.9%    Family History     Problem Relation (Age of Onset)    Diabetes Father    Hypertension Mother, Father          Tobacco Use    Smoking status: Current Every Day Smoker     Packs/day: 1.00   Substance and Sexual Activity    Alcohol use: Not Currently     Comment: Alcohol abuse, stopped since December 15, 2019    Drug use: Not Currently    Sexual activity: Not on file       ROS  Objective:     Vital Signs (Most Recent):  Temp: 98.1 °F (36.7 °C) (01/30/20 1140)  Pulse: 78 (01/30/20 1140)  Resp: 16 (01/30/20 1140)  BP: 118/73 (01/30/20 1140)  SpO2: 97 % (01/30/20 1140) Vital Signs (24h Range):  Temp:  [97.7 °F (36.5 °C)-98.1 °F (36.7 °C)] 98.1 °F (36.7 °C)  Pulse:  [61-81] 78  Resp:  [13-20] 16  SpO2:  [95 %-99 %] 97 %  BP: ()/(58-88) 118/73     Weight: 85.3 kg (188 lb 0.8 oz)  Body mass index is 28.59 kg/m².    SpO2: 97 %  O2 Device (Oxygen Therapy): room air    No intake or output data in the 24  hours ending 01/30/20 1636    Lines/Drains/Airways     Peripheral Intravenous Line                 Peripheral IV - Single Lumen 01/30/20 0120 20 G Right Hand less than 1 day                Physical Exam  HEENT: Normocephalic, atraumatic, PERRL, Conjunctiva pink, no scleral icterus.   CVS: S1S2+, RRR, no murmurs, rubs or gallops, JVP: Normal.  LUNGS: Clear  ABDOMEN: Soft, NT, BS+  EXTREMITIES: No cyanosis, clubbing or edema  NEURO: AAO X 3.       Significant Labs:   BMP:   Recent Labs   Lab 01/30/20  0120   GLU 94      K 3.8      CO2 24   BUN 16   CREATININE 1.1   CALCIUM 9.2   , CMP   Recent Labs   Lab 01/30/20  0120      K 3.8      CO2 24   GLU 94   BUN 16   CREATININE 1.1   CALCIUM 9.2   PROT 6.8   ALBUMIN 4.0   BILITOT 0.6   ALKPHOS 44*   AST 42*   ALT 94*   ANIONGAP 10   ESTGFRAFRICA >60.0   EGFRNONAA >60.0   , CBC   Recent Labs   Lab 01/30/20  0120   WBC 9.08   HGB 16.2   HCT 46.7      , INR No results for input(s): INR, PROTIME in the last 48 hours., Lipid Panel No results for input(s): CHOL, HDL, LDLCALC, TRIG, CHOLHDL in the last 48 hours. and Troponin   Recent Labs   Lab 01/30/20  0120 01/30/20  0440 01/30/20  1017   TROPONINI 0.137* 0.315* 0.330*       Significant Imaging: Reviewed  Assessment and Plan:     IMPRESSION:  Acute myocardial infarction with ST elevations on telemetry when the patient reportedly was having chest pain concerning for significant coronary artery disease.  History of hypertension.  Controlled.  Abnormal LFTs.      RECOMMENDATIONS:  1.  In view of the intermittent significant chest pain associated with the ST elevations on the telemetry monitor, I advised the patient to have a coronary angiogram done for further evaluation and management.  After discussion of the indications, risks, benefits as well as alternatives to the procedure in [place] terms with the patient as well as his wife he would like to proceed with the procedure.  2.  Further decisions to  be guided based upon the outcome of the coronary angiogram.    Thank you for your consult. I will follow-up with patient. Please contact us if you have any additional questions.    Félix Shaikh MD  Cardiology   [facility]

## 2020-01-30 NOTE — HPI
Mr. Bird is a 34-years-old male who presented to the ED with chief complaint of chest pain. Patient reports 6 weeks history of intermittent chest pain. Chest pain is described as mid sternal, non radiating, no relation to activity, no identifiable aggravating or relieving factors, episodes typically lasting 10-15 minutes.  States yesterday evening he had 2 severe episodes of chest pain which prompted ED presentation.  Also admits last night with chest pain did have nausea with episode of nonbloody emesis.  Denies associated shortness breath, lightheadedness/dizziness, productive cough, fever, chills, diaphoresis, lower extremity swelling or tenderness.  Denies any history of bleeding.  He is a current smoker 1 per day.  Prior history of alcohol abuse.   was previously on Adderall however last dose taken on 1/3/20, denies other recreational drug use.  Patient denies any previous known cardiac evaluation.  In the ED HR 78, RR 18, /88, O2 99% on RA.  Labs WBC 9, H/H 16.2/46.7, platelet 284, D-dimer elevated at 0.52, BUN 16, creatinine 1.1, mild transaminitis with ALP 44, AST 42, ALT 94, BNP 17, troponin 0.315.  In the setting elevated D-dimer CTA chest was obtained without any filling defects suggestive of PE.  Chest x-ray with no focal infiltrates or significant pleural effusion.  EKG with sinus rhythm at 69 beats per minute, early repolarization seen.  He received aspirin 325 mg and 2% topical nitro.  Case discussed with the ED provider.  During my encounter patient was initially sleeping, easily awaken and comfortable, chest-pain free.    Past medical history:  GERD, hypertension, history of alcoholism  Past surgical history:  Hernia repair  Family history:  Mother had moyamoya,  in her 50s  Social history:  Prior history of alcohol abuse, denies dependency,  has been abstaining since to December 15, 2019 as was affecting his work and family life, employed as a , current smoker 1  pack per day, denies other recreational drug use

## 2020-01-30 NOTE — Clinical Note
Angiography performed of the left coronary arteries. Angiography performed via power injection with 8 mL contrast at 4 mL/sPower injection PSI was 450..

## 2020-01-30 NOTE — ED NOTES
Pt states had episode of chest pain/pressure, lasting about a minute, resolved shortly after notifying staff via call light. Dr. Guillermo moses. Pt medicated per md orders.

## 2020-01-30 NOTE — ED NOTES
Pt resting quietly in bed, denies any pain but states just feels tired. Monitor shows SR, resp even and unlabored.

## 2020-01-30 NOTE — H&P
Critical access hospital Medicine  History & Physical    Patient Name: Trace Bird III  MRN: [id number]  Admission Date: 1/30/2020  Attending Physician: Pratima Valdez MD   Primary Care Provider: Kristine Bernardo MD         Patient information was obtained from patient and ER records.     Subjective:     Principal Problem:NSTEMI (non-ST elevated myocardial infarction)    Chief Complaint:   Chief Complaint   Patient presents with    Chest Pain     pt c/o midsternal chest pain lasting 20 minutes that started earlier tonight pt denies SOB. N/V/D        HPI: Mr. Bird is a 34-years-old male who presented to the ED with chief complaint of chest pain. Patient reports 6 weeks history of intermittent chest pain. Chest pain is described as mid sternal, non radiating, no relation to activity, no identifiable aggravating or relieving factors, episodes typically lasting 10-15 minutes.  States yesterday evening he had 2 severe episodes of chest pain which prompted ED presentation.  Also admits last night with chest pain did have nausea with episode of nonbloody emesis.  Denies associated shortness breath, lightheadedness/dizziness, productive cough, fever, chills, diaphoresis, lower extremity swelling or tenderness.  Denies any history of bleeding.  He is a current smoker 1 per day.  Prior history of alcohol abuse.  States was previously on Adderall however last dose taken on 1/3/20, denies other recreational drug use.  Patient denies any previous known cardiac evaluation.  In the ED HR 78, RR 18, /88, O2 99% on RA.  Labs WBC 9, H/H 16.2/46.7, platelet 284, D-dimer elevated at 0.52, BUN 16, creatinine 1.1, mild transaminitis with ALP 44, AST 42, ALT 94, BNP 17, troponin 0.315.  In the setting elevated D-dimer CTA chest was obtained without any filling defects suggestive of PE.  Chest x-ray with no focal infiltrates or significant pleural effusion.  EKG with sinus rhythm at 69 beats per minute, early  repolarization seen.  He received aspirin 325 mg and 2% topical nitro.  Case discussed with the ED provider.  During my encounter patient was initially sleeping, easily awaken and comfortable, chest-pain free.    Past medical history:  GERD, hypertension, history of alcoholism  Past surgical history:  Hernia repair  Family history:  Mother had verito,  in her 50s  Social history:  Prior history of alcohol abuse, denies dependency, states has been abstaining since to December 15, 2019 as was affecting his work and family life, employed as a , current smoker 1 pack per day, denies other recreational drug use    Past Medical History:   Diagnosis Date    GERD (gastroesophageal reflux disease)     Helicobacter pylori (H. pylori)     Hypertension        Past Surgical History:   Procedure Laterality Date    HERNIA REPAIR         Review of patient's allergies indicates:  No Known Allergies    No current facility-administered medications on file prior to encounter.      Current Outpatient Medications on File Prior to Encounter   Medication Sig    enalapril (VASOTEC) 20 MG tablet Take 20 mg by mouth 2 (two) times daily.     esomeprazole (NEXIUM) 40 MG capsule Take 40 mg by mouth before breakfast.    ibuprofen (ADVIL,MOTRIN) 800 MG tablet Take 800 mg by mouth 3 (three) times daily as needed for Pain.    [DISCONTINUED] dextroamphetamine-amphetamine (ADDERALL XR) 20 MG 24 hr capsule Take 20 mg by mouth every morning.    [DISCONTINUED] HYDROcodone-acetaminophen (NORCO) 5-325 mg per tablet Take 1 tablet by mouth every 6 (six) hours as needed.    [DISCONTINUED] omeprazole (PRILOSEC) 10 MG capsule Take 10 mg by mouth once daily.     Family History     Problem Relation (Age of Onset)    Diabetes Father    Hypertension Mother, Father        Tobacco Use    Smoking status: Current Every Day Smoker     Packs/day: 1.00   Substance and Sexual Activity    Alcohol use: Not Currently     Comment: Alcohol abuse,  stopped since December 15, 2019    Drug use: Not Currently    Sexual activity: Not on file     Review of Systems   Constitutional: Negative for chills and fever.   HENT: Negative for congestion, postnasal drip, rhinorrhea, sinus pain and trouble swallowing.    Eyes: Negative for visual disturbance.   Respiratory: Negative for cough and shortness of breath.    Cardiovascular: Positive for chest pain. Negative for palpitations and leg swelling.   Gastrointestinal: Positive for vomiting (1 episode of non bloody emesis). Negative for abdominal pain, blood in stool, constipation, diarrhea and nausea.   Genitourinary: Negative for difficulty urinating, dysuria, frequency and urgency.   Musculoskeletal: Negative for back pain.   Skin: Negative for wound.   Allergic/Immunologic: Negative for immunocompromised state.   Neurological: Negative for dizziness, syncope, speech difficulty and headaches.   Hematological: Does not bruise/bleed easily.   Psychiatric/Behavioral: Negative for confusion.     Objective:     Vital Signs (Most Recent):  Temp: 97.9 °F (36.6 °C) (01/30/20 0115)  Pulse: 61 (01/30/20 0610)  Resp: 14 (01/30/20 0610)  BP: (!) 113/58 (01/30/20 0610)  SpO2: 95 % (01/30/20 0610) Vital Signs (24h Range):  Temp:  [97.9 °F (36.6 °C)] 97.9 °F (36.6 °C)  Pulse:  [61-81] 61  Resp:  [14-20] 14  SpO2:  [95 %-99 %] 95 %  BP: (113-143)/(58-88) 113/58     Weight: 86.2 kg (190 lb)  Body mass index is 28.89 kg/m².    Physical Exam   Constitutional: He is oriented to person, place, and time. He appears well-developed and well-nourished. No distress.   Sleeping initially, lying in stretcher, no apparent distress, cooperative   HENT:   Head: Normocephalic and atraumatic.   Moist mucous membrane   Eyes: Pupils are equal, round, and reactive to light. Conjunctivae and EOM are normal. Right eye exhibits no discharge. Left eye exhibits no discharge.   Neck: Neck supple. No JVD present. No tracheal deviation present.    Cardiovascular: Normal rate, regular rhythm, normal heart sounds and intact distal pulses.   No lower extremity edema. No calf tenderness.   Pulmonary/Chest: Effort normal and breath sounds normal. No stridor. No respiratory distress. He has no wheezes. He has no rales.   Abdominal: Soft. Bowel sounds are normal. He exhibits no distension. There is no tenderness. There is no rebound and no guarding.   Genitourinary:   Genitourinary Comments: No suprapubic fullness.  No Costa catheter present.   Musculoskeletal:   No reproducible chest wall tenderness to palpation   Neurological: He is alert and oriented to person, place, and time. No cranial nerve deficit or sensory deficit. He exhibits normal muscle tone.   Speech is intact.  Power 5/5 all 4 extremities.  Following commands.   Skin: Skin is warm and dry. Capillary refill takes less than 2 seconds. He is not diaphoretic.   Psychiatric: He has a normal mood and affect.   Vitals reviewed.        CRANIAL NERVES     CN III, IV, VI   Pupils are equal, round, and reactive to light.  Extraocular motions are normal.        Significant Labs:   Bilirubin:   Recent Labs   Lab 01/30/20  0120   BILITOT 0.6     BMP:   Recent Labs   Lab 01/30/20  0120   GLU 94      K 3.8      CO2 24   BUN 16   CREATININE 1.1   CALCIUM 9.2     CBC:   Recent Labs   Lab 01/30/20  0120   WBC 9.08   HGB 16.2   HCT 46.7        CMP:   Recent Labs   Lab 01/30/20  0120      K 3.8      CO2 24   GLU 94   BUN 16   CREATININE 1.1   CALCIUM 9.2   PROT 6.8   ALBUMIN 4.0   BILITOT 0.6   ALKPHOS 44*   AST 42*   ALT 94*   ANIONGAP 10   EGFRNONAA >60.0     Cardiac Markers:   Recent Labs   Lab 01/30/20  0120   BNP 17     Coagulation: No results for input(s): PT, INR, APTT in the last 48 hours.  Lactic Acid: No results for input(s): LACTATE in the last 48 hours.  Lipase: No results for input(s): LIPASE in the last 48 hours.  Magnesium: No results for input(s): MG in the last 48  hours.  POCT Glucose: No results for input(s): POCTGLUCOSE in the last 48 hours.  Troponin:   Recent Labs   Lab 01/30/20  0120 01/30/20  0440   TROPONINI 0.137* 0.315*     Urine Culture: No results for input(s): LABURIN in the last 48 hours.  Urine Studies: No results for input(s): COLORU, APPEARANCEUA, PHUR, SPECGRAV, PROTEINUA, GLUCUA, KETONESU, BILIRUBINUA, OCCULTUA, NITRITE, UROBILINOGEN, LEUKOCYTESUR, RBCUA, WBCUA, BACTERIA, SQUAMEPITHEL, HYALINECASTS in the last 48 hours.    Invalid input(s): WRIGHTSUR  All pertinent labs within the past 24 hours have been reviewed.    Significant Imaging: I have reviewed all pertinent imaging results/findings within the past 24 hours.   X-ray Chest Pa And Lateral    Result Date: 1/30/2020  EXAMINATION: XR CHEST PA AND LATERAL CLINICAL HISTORY: Chest Pain; COMPARISON: CT angiogram chest same day FINDINGS: Cardiac silhouette size is within normal limits.  No airspace consolidation.  No pleural fluid or pneumothorax.  No acute osseous abnormality.     No acute pulmonary process. Electronically signed by: Jose Andrade MD Date:    01/30/2020 Time:    06:58    Cta Chest Non-coronary (pe Study)    Result Date: 1/30/2020  Exam: CTA OF THE CHEST WITH CONTRAST Clinical data: Hypertension.  PE protocol. Technique: Axial CT angiography images through the lungs were acquired with contrast and imaged using soft tissue and lung algorithms. Coronal, sagittal, and 3D volume reconstructions were performed. Reformatted/3D-MPR images were performed.  Contrast used: Omnipaque 350.  Amount: 100 mL.  Radiation dose: CTDIvol = 28.5 mGy, DLP = 431.8 mGy x cm. Prior studies: No prior studies submitted. Findings: Lungs: No pulmonary infiltrate identified. No pulmonary mass identified. No pleural effusions identified. No pneumothorax. The airway is clear. Soft Tissues: No mediastinal, axillary or supraclavicular adenopathy is identified. Vascular: No filling defect within the pulmonary arteries to  the segmental branch level. Unremarkable aorta. Grossly unremarkable sized heart.  No definite abnormality seen on 3D reformatted images. Bony structures: No acute or destructive abnormality Upper Abdomen: Limited visualization of the solid upper abdominal organs is grossly unremarkable. IMPRESSION: 1. No filling defect within the pulmonary arteries to the segmental branch level 2. No acute cardiopulmonary process. Recommendation:  Follow up as clinically indicated. All CT scans at this facility utilize dose modulation, iterative reconstruction, and/or weight based dosing when appropriate to reduce radiation dose to as low as reasonably achievable. Electronically Signed by JOAO BERRY MD at 1/30/2020 6:50:15 AM EST  ECG Results          EKG 12-lead (In process)  Result time 01/30/20 04:35:33    In process by Interface, Lab In [facility] (01/30/20 04:35:33)                 Narrative:    Test Reason : R07.9,    Vent. Rate : 069 BPM     Atrial Rate : 069 BPM     P-R Int : 166 ms          QRS Dur : 098 ms      QT Int : 392 ms       P-R-T Axes : 071 071 067 degrees     QTc Int : 420 ms    Normal sinus rhythm  Early repolarization  Normal ECG  When compared with ECG of 30-JAN-2020 01:16,  No significant change was found    Referred By: [email or web address]   SELF           Confirmed By:                              EKG 12-lead (In process)  Result time 01/30/20 04:35:31    In process by Interface, Lab In [facility] (01/30/20 04:35:31)                 Narrative:    Test Reason : R07.9,    Vent. Rate : 078 BPM     Atrial Rate : 078 BPM     P-R Int : 168 ms          QRS Dur : 098 ms      QT Int : 386 ms       P-R-T Axes : 067 078 071 degrees     QTc Int : 440 ms    Normal sinus rhythm  Early repolarization  Normal ECG  When compared with ECG of 30-JAN-2020 01:14,  No significant change was found    Referred By: [email or web address]   SELF           Confirmed By:                                 Assessment/Plan:     * NSTEMI (non-ST elevated  myocardial infarction)  Patient presenting with substernal nonradiating chest pain, acute worsening.  Risk factors include smoking history as well as hypertension.  No prior cardiac evaluation.  EKG without any ST elevations.  Troponin has up trended to 0.315.  No evidence of pneumonia, elevated D-dimer however negative CTA ruling out PE.  Admit inpatient, cardiac telemetry  Aspirin 81 mg daily, Lipitor 80 mg q.h.s.  Hold off beta-blocker until urine drug screen returns- I have ordered  Add on CPK to labs  Check lipid panel and HbA1c  Complete echocardiogram ordered  Sublingual nitro p.r.n. recurrent chest pain  One time dose therapeutic Lovenox  Consult Cardiology      Essential hypertension  Patient reports history of essential hypertension on enalapril 20 mg daily and states he was compliant.  Continue enalapril and monitor blood pressure.      Transaminitis  Mild elevation in LFTs.  States prior history of alcohol abuse however abstaining for about 1 month.  Check CPK.  Monitor LFTs with starting of statin.      Substance abuse  Patient admits to prior Adderall use, states it was prescription.  Checking urine drug screen.      Alcohol abuse  Patient reports prior history of binge drinking, denies dependency.  Last drink consumed December 2019.      Nicotine abuse  Patient is a current smoker 1 pack per day.  Declines pharmacotherapy add nicotine patch.  Continue to reinforce smoking cessation.      GERD (gastroesophageal reflux disease)  Known history of GERD.  Will change to IV PPI for now.  Monitoring.        VTE Risk Mitigation (From admission, onward)    None        I was called by nursing at around 4 p.m. patient having 'tombstoning on telemetry'.  Patient assessed immediately at bedside.  Active chest pain, described as midsternal, severe, lasting 3-5 minutes, tingling sensation bilateral arms.  He states in the last 30 min has had about 3 episodes of chest pain. Vitals /73, HR 78, RR 16, O2 sats 97%  on RA.  Serial EKGs obtain at bedside.  Dr. Shaikh, cardiology arrived bedside.  Discussed with Cardiology and nursing.  Supplemental oxygen to be applied, topical nitro paste.  Patient to be taken to cath urgently for left catheterization angiographic evaluation coronary arteries.  Critical care 32 minutes.     Kalpana Mathews MD  Department of Hospital Medicine   [facility]

## 2020-01-31 VITALS
DIASTOLIC BLOOD PRESSURE: 79 MMHG | WEIGHT: 188.06 LBS | HEIGHT: 68 IN | OXYGEN SATURATION: 95 % | HEART RATE: 77 BPM | RESPIRATION RATE: 15 BRPM | BODY MASS INDEX: 28.5 KG/M2 | SYSTOLIC BLOOD PRESSURE: 132 MMHG | TEMPERATURE: 98 F

## 2020-01-31 PROBLEM — I21.02 MYOCARDIAL INFARCTION INVOLVING LEFT ANTERIOR DESCENDING (LAD) CORONARY ARTERY: Status: ACTIVE | Noted: 2020-01-30

## 2020-01-31 PROBLEM — I21.02 MYOCARDIAL INFARCTION INVOLVING LEFT ANTERIOR DESCENDING (LAD) CORONARY ARTERY: Status: RESOLVED | Noted: 2020-01-30 | Resolved: 2020-01-31

## 2020-01-31 PROBLEM — F10.10 ALCOHOL ABUSE: Chronic | Status: RESOLVED | Noted: 2020-01-30 | Resolved: 2020-01-31

## 2020-01-31 PROBLEM — F19.10 SUBSTANCE ABUSE: Chronic | Status: RESOLVED | Noted: 2020-01-30 | Resolved: 2020-01-31

## 2020-01-31 LAB
ALBUMIN SERPL BCP-MCNC: 3.7 G/DL (ref 3.5–5.2)
ALP SERPL-CCNC: 40 U/L (ref 55–135)
ALT SERPL W/O P-5'-P-CCNC: 94 U/L (ref 10–44)
ANION GAP SERPL CALC-SCNC: 7 MMOL/L (ref 8–16)
AST SERPL-CCNC: 33 U/L (ref 10–40)
BASOPHILS # BLD AUTO: 0.04 K/UL (ref 0–0.2)
BASOPHILS NFR BLD: 0.4 % (ref 0–1.9)
BILIRUB SERPL-MCNC: 1 MG/DL (ref 0.1–1)
BUN SERPL-MCNC: 16 MG/DL (ref 6–20)
CALCIUM SERPL-MCNC: 8.8 MG/DL (ref 8.7–10.5)
CHLORIDE SERPL-SCNC: 102 MMOL/L (ref 95–110)
CHOLEST SERPL-MCNC: 132 MG/DL (ref 120–199)
CHOLEST/HDLC SERPL: 4 {RATIO} (ref 2–5)
CO2 SERPL-SCNC: 26 MMOL/L (ref 23–29)
CREAT SERPL-MCNC: 0.9 MG/DL (ref 0.5–1.4)
DIFFERENTIAL METHOD: ABNORMAL
EOSINOPHIL # BLD AUTO: 0.2 K/UL (ref 0–0.5)
EOSINOPHIL NFR BLD: 2.2 % (ref 0–8)
ERYTHROCYTE [DISTWIDTH] IN BLOOD BY AUTOMATED COUNT: 12.2 % (ref 11.5–14.5)
ERYTHROCYTE [DISTWIDTH] IN BLOOD BY AUTOMATED COUNT: 12.3 % (ref 11.5–14.5)
EST. GFR  (AFRICAN AMERICAN): >60 ML/MIN/1.73 M^2
EST. GFR  (NON AFRICAN AMERICAN): >60 ML/MIN/1.73 M^2
ESTIMATED AVG GLUCOSE: 117 MG/DL (ref 68–131)
GLUCOSE SERPL-MCNC: 88 MG/DL (ref 70–110)
HBA1C MFR BLD HPLC: 5.7 % (ref 4.5–6.2)
HCT VFR BLD AUTO: 43.2 % (ref 40–54)
HCT VFR BLD AUTO: 44 % (ref 40–54)
HDLC SERPL-MCNC: 33 MG/DL (ref 40–75)
HDLC SERPL: 25 % (ref 20–50)
HGB BLD-MCNC: 15.1 G/DL (ref 14–18)
HGB BLD-MCNC: 15.1 G/DL (ref 14–18)
IMM GRANULOCYTES # BLD AUTO: 0.03 K/UL (ref 0–0.04)
IMM GRANULOCYTES NFR BLD AUTO: 0.3 % (ref 0–0.5)
LDLC SERPL CALC-MCNC: 80 MG/DL (ref 63–159)
LYMPHOCYTES # BLD AUTO: 3 K/UL (ref 1–4.8)
LYMPHOCYTES NFR BLD: 33.3 % (ref 18–48)
MAGNESIUM SERPL-MCNC: 1.8 MG/DL (ref 1.6–2.6)
MCH RBC QN AUTO: 30.9 PG (ref 27–31)
MCH RBC QN AUTO: 31.6 PG (ref 27–31)
MCHC RBC AUTO-ENTMCNC: 34.3 G/DL (ref 32–36)
MCHC RBC AUTO-ENTMCNC: 35 G/DL (ref 32–36)
MCV RBC AUTO: 90 FL (ref 82–98)
MCV RBC AUTO: 90 FL (ref 82–98)
MONOCYTES # BLD AUTO: 1.2 K/UL (ref 0.3–1)
MONOCYTES NFR BLD: 13 % (ref 4–15)
NEUTROPHILS # BLD AUTO: 4.6 K/UL (ref 1.8–7.7)
NEUTROPHILS NFR BLD: 50.8 % (ref 38–73)
NONHDLC SERPL-MCNC: 99 MG/DL
NRBC BLD-RTO: 0 /100 WBC
PHOSPHATE SERPL-MCNC: 4.7 MG/DL (ref 2.7–4.5)
PLATELET # BLD AUTO: 232 K/UL (ref 150–350)
PLATELET # BLD AUTO: 236 K/UL (ref 150–350)
PMV BLD AUTO: 9.2 FL (ref 9.2–12.9)
PMV BLD AUTO: 9.3 FL (ref 9.2–12.9)
POTASSIUM SERPL-SCNC: 3.8 MMOL/L (ref 3.5–5.1)
PROT SERPL-MCNC: 6.3 G/DL (ref 6–8.4)
RBC # BLD AUTO: 4.78 M/UL (ref 4.6–6.2)
RBC # BLD AUTO: 4.89 M/UL (ref 4.6–6.2)
SODIUM SERPL-SCNC: 135 MMOL/L (ref 136–145)
TRIGL SERPL-MCNC: 95 MG/DL (ref 30–150)
TROPONIN I SERPL DL<=0.01 NG/ML-MCNC: 0.33 NG/ML
WBC # BLD AUTO: 8.91 K/UL (ref 3.9–12.7)
WBC # BLD AUTO: 8.98 K/UL (ref 3.9–12.7)

## 2020-01-31 PROCEDURE — 85027 COMPLETE CBC AUTOMATED: CPT

## 2020-01-31 PROCEDURE — 80053 COMPREHEN METABOLIC PANEL: CPT

## 2020-01-31 PROCEDURE — 84484 ASSAY OF TROPONIN QUANT: CPT

## 2020-01-31 PROCEDURE — C9113 INJ PANTOPRAZOLE SODIUM, VIA: HCPCS | Performed by: INTERNAL MEDICINE

## 2020-01-31 PROCEDURE — 83735 ASSAY OF MAGNESIUM: CPT

## 2020-01-31 PROCEDURE — 85025 COMPLETE CBC W/AUTO DIFF WBC: CPT

## 2020-01-31 PROCEDURE — 83036 HEMOGLOBIN GLYCOSYLATED A1C: CPT

## 2020-01-31 PROCEDURE — 25000003 PHARM REV CODE 250: Performed by: INTERNAL MEDICINE

## 2020-01-31 PROCEDURE — 36415 COLL VENOUS BLD VENIPUNCTURE: CPT

## 2020-01-31 PROCEDURE — 84100 ASSAY OF PHOSPHORUS: CPT

## 2020-01-31 PROCEDURE — 94761 N-INVAS EAR/PLS OXIMETRY MLT: CPT

## 2020-01-31 PROCEDURE — 63600175 PHARM REV CODE 636 W HCPCS: Performed by: INTERNAL MEDICINE

## 2020-01-31 PROCEDURE — 80061 LIPID PANEL: CPT

## 2020-01-31 RX ORDER — METOPROLOL TARTRATE 25 MG/1
12.5 TABLET, FILM COATED ORAL 2 TIMES DAILY
Qty: 30 TABLET | Refills: 0 | Status: SHIPPED | OUTPATIENT
Start: 2020-01-31 | End: 2021-05-21 | Stop reason: SDUPTHER

## 2020-01-31 RX ORDER — ASPIRIN 81 MG/1
81 TABLET ORAL DAILY
Qty: 30 TABLET | Refills: 0 | Status: SHIPPED | OUTPATIENT
Start: 2020-02-01 | End: 2023-07-12

## 2020-01-31 RX ORDER — ATORVASTATIN CALCIUM 80 MG/1
80 TABLET, FILM COATED ORAL DAILY
Qty: 30 TABLET | Refills: 0 | Status: SHIPPED | OUTPATIENT
Start: 2020-01-31 | End: 2023-07-19 | Stop reason: SDUPTHER

## 2020-01-31 RX ORDER — ENALAPRIL MALEATE 20 MG/1
20 TABLET ORAL DAILY
Qty: 30 TABLET | Refills: 0 | Status: SHIPPED | OUTPATIENT
Start: 2020-01-31 | End: 2023-07-12

## 2020-01-31 RX ADMIN — TICAGRELOR 90 MG: 90 TABLET ORAL at 06:01

## 2020-01-31 RX ADMIN — ASPIRIN 81 MG: 81 TABLET, DELAYED RELEASE ORAL at 08:01

## 2020-01-31 RX ADMIN — HYDROCODONE BITARTRATE AND ACETAMINOPHEN 1 TABLET: 5; 325 TABLET ORAL at 08:01

## 2020-01-31 RX ADMIN — ENALAPRIL MALEATE 20 MG: 5 TABLET ORAL at 08:01

## 2020-01-31 RX ADMIN — PANTOPRAZOLE SODIUM 40 MG: 40 INJECTION, POWDER, FOR SOLUTION INTRAVENOUS at 08:01

## 2020-01-31 NOTE — HOSPITAL COURSE
Patient was admitted with working diagnosis of non ST elevation MI in the setting of chest pain and elevated troponin without any ST elevations on EKG.  He was given therapeutic dose Lovenox, aspirin, statin was started.  Given prior history of drug use urine drug screen was ordered and therefore beta-blocker was not initially started.  Serial cardiac biomarkers were trended.  Cardiology was consulted.  On day of admission patient had recurrent chest pain with ST elevation seen on telemetry during these episodes.  He was assessed and taken urgently for left heart catheterization which demonstrated 95% stenosis of proximal LAD, drug-eluting stent was successfully placed with ASPEN 3 flow achieved.  He was monitored as per post angiogram protocol.  Medications were optimized including addition of Brilinta and low-dose beta-blocker.  Unfortunately urine drug screen was not collected however patient was adamant he was not taking any recreational substances, he was counseled about potential side effects while taking beta-blocker if using recreational substances.  He remained chest pain-free, ambulated around the unit multiple times.  He was seen by Cardiology and cleared for discharge. Discharge plan including medication changes, smoking cessation counseling, cardiac rehabilitation, follow up as well as return precautions were explained to the patient.  Discussed with the patient obtaining repeat lab testing prior to cardiology follow-up appointment in 1 week.    Discharge examination  Lying in bed, no apparent distress, cooperative  Regular heart rhythm, no lower extremity edema  Not on supplemental oxygen, some occasional end expiratory wheezing heard  Radial access with clean dry intact dressing  Abdomen soft and nontender

## 2020-01-31 NOTE — PLAN OF CARE
01/31/20 1635   Final Note   Assessment Type Final Discharge Note   Anticipated Discharge Disposition Home   Patient with discharge orders for today to home. No CM/DC planning needs noted.

## 2020-01-31 NOTE — PROGRESS NOTES
[facility]  Cardiology  Progress Note    Patient Name: Trace Bird III  MRN: [id number]  Admission Date: 1/30/2020  Hospital Length of Stay: 1 days  Code Status: Full Code   Attending Physician: Kalpana Mathews MD   Primary Care Physician: Kristine Bernardo MD  Expected Discharge Date:   Principal Problem:NSTEMI (non-ST elevated myocardial infarction)    Subjective:       Interval History: Patient denies any further chest pain or shortness of breath. He was able to walk around the unit several times without difficulty. Right radial site is CDI with no bruising or oozing and palpable peripheral pulses     ROS  Objective:     Vital Signs (Most Recent):  Temp: 98.2 °F (36.8 °C) (01/31/20 1200)  Pulse: 88 (01/31/20 1200)  Resp: 15 (01/31/20 0852)  BP: 131/75 (01/31/20 1200)  SpO2: 95 % (01/31/20 1200) Vital Signs (24h Range):  Temp:  [97.6 °F (36.4 °C)-98.2 °F (36.8 °C)] 98.2 °F (36.8 °C)  Pulse:  [65-88] 88  Resp:  [12-15] 15  SpO2:  [94 %-98 %] 95 %  BP: (115-143)/(62-80) 131/75     Weight: 85.3 kg (188 lb 0.8 oz)  Body mass index is 28.59 kg/m².    SpO2: 95 %  O2 Device (Oxygen Therapy): room air    No intake or output data in the 24 hours ending 01/31/20 1559    Lines/Drains/Airways     Peripheral Intravenous Line                 Peripheral IV - Single Lumen 01/30/20 0120 20 G Right Hand 1 day         Peripheral IV - Single Lumen 01/30/20 1800 20 G Left Antecubital less than 1 day                Scheduled Meds:   aspirin  325 mg Oral Once    aspirin  81 mg Oral Daily    atorvastatin  80 mg Oral Daily    diphenhydrAMINE  25 mg Oral Once    enalapril  20 mg Oral Daily    pantoprazole  40 mg Intravenous Daily    polyethylene glycol  17 g Oral Daily    ticagrelor  90 mg Oral BID     Continuous Infusions:   sodium chloride 0.9%      sodium chloride 0.9% 100 mL/hr at 01/30/20 1840    nitroGLYCERIN 5 mcg/min (01/30/20 2247)     PRN Meds:.acetaminophen, HYDROcodone-acetaminophen, morphine,  nitroGLYCERIN, ondansetron, sodium chloride 0.9%     Physical Exam  HEENT: Normocephalic, atraumatic, PERRL, Conjunctiva pink, no scleral icterus.   CVS: S1S2+, RRR, no murmurs, rubs or gallops, JVP: Normal.  LUNGS: Clear  ABDOMEN: Soft, NT, BS+  EXTREMITIES: No cyanosis, clubbing or edema  NEURO: AAO X 3.   Right wrist : CDI     Significant Labs:   ABG: No results for input(s): PH, PCO2, HCO3, POCSATURATED, BE in the last 48 hours., Blood Culture: No results for input(s): LABBLOO in the last 48 hours., BMP:   Recent Labs   Lab 01/30/20  0120 01/31/20  0238   GLU 94 88    135*   K 3.8 3.8    102   CO2 24 26   BUN 16 16   CREATININE 1.1 0.9   CALCIUM 9.2 8.8   MG  --  1.8   , CMP   Recent Labs   Lab 01/30/20 0120 01/31/20  0238    135*   K 3.8 3.8    102   CO2 24 26   GLU 94 88   BUN 16 16   CREATININE 1.1 0.9   CALCIUM 9.2 8.8   PROT 6.8 6.3   ALBUMIN 4.0 3.7   BILITOT 0.6 1.0   ALKPHOS 44* 40*   AST 42* 33   ALT 94* 94*   ANIONGAP 10 7*   ESTGFRAFRICA >60.0 >60.0   EGFRNONAA >60.0 >60.0   , CBC   Recent Labs   Lab 01/30/20  0120 01/31/20  0238   WBC 9.08 8.91  8.98   HGB 16.2 15.1  15.1   HCT 46.7 44.0  43.2    232  236   , INR No results for input(s): INR, PROTIME in the last 48 hours., Lipid Panel   Recent Labs   Lab 01/31/20  0238   CHOL 132   HDL 33*   LDLCALC 80.0   TRIG 95   CHOLHDL 25.0   , Troponin   Recent Labs   Lab 01/30/20  1609 01/30/20 2012 01/31/20 0238   TROPONINI 0.187* 0.149* 0.328*    and All pertinent lab results from the last 24 hours have been reviewed.    Significant Imaging: X-Ray: CXR: X-Ray Chest 1 View (CXR): No results found for this visit on 01/30/20.  Assessment and Plan:     IMPRESSION:  Acute myocardial infarction with ST elevations on telemetry when the patient reportedly was having chest pain concerning for significant coronary artery disease. S/p LHC with 95% stenosis of the proximal LAD s/p PCI with a 4.0 x 15 mm [place] BERNARDINO with 0% residual  stenosis and ASPEN 3 flow.   History of hypertension.  Controlled.  Abnormal LFTs.      PLAN:  1. Patient is stable for discharge this afternoon from our standpoint.  2. Patient will need to follow up in the office in 1 week.  3. Please send home with Brilinta 90 mg twice daily and ASA 81 mg once daily. Continue enalapril 20 mg once daily. Will add beta blocker if BP acceptable as an outpatient.   4. Post cath instructions provided.  5. Plan to repeat LFTs as an outpatient.         Trinity Gan NP  Cardiology  [facility]    I have personally seen and examined the patient. I reviewed the notes, assessments, and/or procedures performed by Ms Trinity Gan, I concur with her documentation of Trace Bird III.

## 2020-01-31 NOTE — ASSESSMENT & PLAN NOTE
Patient reports history of essential hypertension on enalapril 20 mg daily and states he was compliant.  Continue enalapril, adding low-dose Lopressor.

## 2020-01-31 NOTE — BRIEF OP NOTE
S/p LHC via right radial.   95% stenosis of proximal LAD s/p PCI with a 4.0 X 15 mm [place] BERNARDINO with 0% residual stenosis and ASPEN 3 flow.

## 2020-01-31 NOTE — DISCHARGE SUMMARY
[facility] Medicine  Discharge Summary      Patient Name: Trace Bird III  MRN: [id number]  Admission Date: 1/30/2020  Hospital Length of Stay: 1 days  Discharge Date and Time:  01/31/2020 4:42 PM  Attending Physician: Kalpana Mathews MD   Discharging Provider: Kalpana Mathews MD  Primary Care Provider: Kristine Bernardo MD      HPI:   Mr. Bird is a 34-years-old male who presented to the ED with chief complaint of chest pain. Patient reports 6 weeks history of intermittent chest pain. Chest pain is described as mid sternal, non radiating, no relation to activity, no identifiable aggravating or relieving factors, episodes typically lasting 10-15 minutes.  States yesterday evening he had 2 severe episodes of chest pain which prompted ED presentation.  Also admits last night with chest pain did have nausea with episode of nonbloody emesis.  Denies associated shortness breath, lightheadedness/dizziness, productive cough, fever, chills, diaphoresis, lower extremity swelling or tenderness.  Denies any history of bleeding.  He is a current smoker 1 per day.  Prior history of alcohol abuse.   was previously on Adderall however last dose taken on 1/3/20, denies other recreational drug use.  Patient denies any previous known cardiac evaluation.  In the ED HR 78, RR 18, /88, O2 99% on RA.  Labs WBC 9, H/H 16.2/46.7, platelet 284, D-dimer elevated at 0.52, BUN 16, creatinine 1.1, mild transaminitis with ALP 44, AST 42, ALT 94, BNP 17, troponin 0.315.  In the setting elevated D-dimer CTA chest was obtained without any filling defects suggestive of PE.  Chest x-ray with no focal infiltrates or significant pleural effusion.  EKG with sinus rhythm at 69 beats per minute, early repolarization seen.  He received aspirin 325 mg and 2% topical nitro.  Case discussed with the ED provider.  During my encounter patient was initially sleeping, easily awaken and comfortable, chest-pain  free.    Past medical history:  GERD, hypertension, history of alcoholism  Past surgical history:  Hernia repair  Family history:  Mother had bebamochris,  in her 50s  Social history:  Prior history of alcohol abuse, denies dependency, states has been abstaining since to December 15, 2019 as was affecting his work and family life, employed as a , current smoker 1 pack per day, denies other recreational drug use    Procedure(s) (LRB):  Angiogram, Coronary, with Left Heart Cath (N/A)  Percutaneous coronary intervention (N/A)      Hospital Course:   Patient was admitted with working diagnosis of non ST elevation MI in the setting of chest pain and elevated troponin without any ST elevations on EKG.  He was given therapeutic dose Lovenox, aspirin, statin was started.  Given prior history of drug use urine drug screen was ordered and therefore beta-blocker was not initially started.  Serial cardiac biomarkers were trended.  Cardiology was consulted.  On day of admission patient had recurrent chest pain with ST elevation seen on telemetry during these episodes.  He was assessed and taken urgently for left heart catheterization which demonstrated 95% stenosis of proximal LAD, drug-eluting stent was successfully placed with ASPEN 3 flow achieved.  He was monitored as per post angiogram protocol.  Medications were optimized including addition of Brilinta and low-dose beta-blocker.  Unfortunately urine drug screen was not collected however patient was adamant he was not taking any recreational substances, he was counseled about potential side effects while taking beta-blocker if using recreational substances.  He remained chest pain-free, ambulated around the unit multiple times.  He was seen by Cardiology and cleared for discharge. Discharge plan including medication changes, smoking cessation counseling, cardiac rehabilitation, follow up as well as return precautions were explained to the patient.  Discussed with  the patient obtaining repeat lab testing prior to cardiology follow-up appointment in 1 week.    Discharge examination  Lying in bed, no apparent distress, cooperative  Regular heart rhythm, no lower extremity edema  Not on supplemental oxygen, some occasional end expiratory wheezing heard  Radial access with clean dry intact dressing  Abdomen soft and nontender     Consults:   Consults (From admission, onward)        Status Ordering Provider     Inpatient consult to Cardiology  Once     Provider:  Georgia Shaikh MD    Completed ALLYSSA SANTOS     Inpatient consult to Hospitalist  Once     Provider:  Martha Hoyt MD    Acknowledged GEORGIA SHAIKH          * Myocardial infarction involving left anterior descending (LAD) coronary artery, acute  Status post left heart catheterization with 95% stenosis of proximal LAD status post successful placement of drug-eluting stent.  Aspirin, Brilinta low-dose beta-blocker, statin.  Outpatient cardiology follow-up in 1 week      Essential hypertension  Patient reports history of essential hypertension on enalapril 20 mg daily and states he was compliant.  Continue enalapril, adding low-dose Lopressor.      Transaminitis  Mild transaminitis.  History of prior alcoholism.  Repeat CMP in 1 week given starting high intensity statin.      History of substance abuse  Patient admits to prior Adderall use.  Adamantly denied current use.  Counseled about potential side effects especially with beta-blockers.    Alcohol abuse  Patient reports prior history of binge drinking, denies dependency.  Last drink consumed December 2019.      Nicotine abuse  Patient is a current smoker 1 pack per day.  Smoking cessation counseling was provided.    GERD (gastroesophageal reflux disease)  Known history of GERD.  Continue PPI therapy.      Final Active Diagnoses:    Diagnosis Date Noted POA    PRINCIPAL PROBLEM:  Myocardial infarction involving left anterior descending (LAD)  coronary artery, acute [I21.02] 01/30/2020 Yes    Essential hypertension [I10] 01/30/2020 Yes     Chronic    Transaminitis [R74.0] 01/30/2020 Yes    GERD (gastroesophageal reflux disease) [K21.9] 01/30/2020 Yes     Chronic    Nicotine abuse [Z72.0] 01/30/2020 Yes     Chronic    Alcohol abuse [F10.10] 01/30/2020 Yes     Chronic    History of substance abuse [F19.10] 01/30/2020 Yes     Chronic      Problems Resolved During this Admission:       Discharged Condition: good    Disposition: Home or Self Care    Follow Up:  Follow-up Information     Félix Shaikh MD In 1 week.    Specialties:  Cardiovascular Disease, Cardiology  Why:  CAD s.p PCi  Contact information:  [address]  [address]  [facility] [id number] [phone]             Kristine Bernardo MD. Schedule an appointment as soon as possible for a visit in 2 weeks.    Specialty:  Gastroenterology  Why:  post hospital follow up  Contact information:  [address]  [address]  [facility] [address] [phone]                 Patient Instructions:      Comprehensive metabolic panel   Standing Status: Future Standing Exp. Date: 03/31/21   Order Comments: Results sent to cardiology - Dr ROJAS Shaikh- for monitoring LFTs     Diet Cardiac     Activity as tolerated       Significant Diagnostic Studies: Labs:   BMP:   Recent Labs   Lab 01/30/20  0120 01/31/20  0238   GLU 94 88    135*   K 3.8 3.8    102   CO2 24 26   BUN 16 16   CREATININE 1.1 0.9   CALCIUM 9.2 8.8   MG  --  1.8   , CMP   Recent Labs   Lab 01/30/20  0120 01/31/20  0238    135*   K 3.8 3.8    102   CO2 24 26   GLU 94 88   BUN 16 16   CREATININE 1.1 0.9   CALCIUM 9.2 8.8   PROT 6.8 6.3   ALBUMIN 4.0 3.7   BILITOT 0.6 1.0   ALKPHOS 44* 40*   AST 42* 33   ALT 94* 94*   ANIONGAP 10 7*   ESTGFRAFRICA >60.0 >60.0   EGFRNONAA >60.0 >60.0   , CBC   Recent Labs   Lab 01/30/20  0120 01/31/20  0238   WBC 9.08 8.91  8.98   HGB 16.2 15.1  15.1   HCT 46.7 44.0  43.2     232  236   , INR No results found for: INR, PROTIME, Lipid Panel   Lab Results   Component Value Date    CHOL 132 01/31/2020    HDL 33 (L) 01/31/2020    LDLCALC 80.0 01/31/2020    TRIG 95 01/31/2020    CHOLHDL 25.0 01/31/2020   , Troponin   Recent Labs   Lab 01/31/20  0238   TROPONINI 0.328*    and A1C:   Recent Labs   Lab 01/31/20  0238   HGBA1C 5.7       Pending Diagnostic Studies:     Procedure Component Value Units Date/Time    Echo Color Flow Doppler? Yes [[id number]]  (Abnormal) Resulted:  01/30/20 1321    Order Status:  Sent Lab Status:  In process Updated:  01/30/20 1322     BSA 2.02 m2      TDI SEPTAL 0.09 m/s      LV LATERAL E/E' RATIO 6.09 m/s      LV SEPTAL E/E' RATIO 7.44 m/s      AORTIC VALVE CUSP SEPERATION 2.17 cm      TDI LATERAL 0.11 m/s      PV PEAK VELOCITY 100.22 cm/s      LVIDD 3.77 cm      IVS 1.20 cm      PW 1.20 cm      Ao root annulus 3.30 cm      LVIDS 2.52 cm      FS 33 %      LV mass 151.44 g      LA size 3.47 cm      RVDD 248.00 cm      Left Ventricle Relative Wall Thickness 0.64 cm      AV mean gradient 4 mmHg      AV valve area 2.75 cm2      AV Velocity Ratio 72.48     AV index (prosthetic) 0.88     E/A ratio 1.02     Mean e' 0.10 m/s      E wave decelartion time 240.97 msec      IVRT 70.45 msec      LVOT diameter 2.00 cm      LVOT area 3.1 cm2      LVOT peak imtiaz 103.64 m/s      LVOT peak VTI 19.92 cm      Ao peak imtiaz 1.43 m/s      Ao VTI 22.76 cm      LVOT stroke volume 62.55 cm3      AV peak gradient 8 mmHg      E/E' ratio 6.70 m/s      MV Peak E Imtiaz 0.67 m/s      MV Peak A Imtiaz 0.66 m/s      LV Systolic Volume 44.00 mL      LV Systolic Volume Index 22.1 mL/m2      LV Diastolic Volume 116.30 mL      LV Diastolic Volume Index 58.41 mL/m2      LV Mass Index 76 g/m2     Narrative:       · Concentric left ventricular remodeling.       Impression:             X-ray Chest Pa And Lateral    Result Date: 1/30/2020  EXAMINATION: XR CHEST PA AND LATERAL CLINICAL HISTORY: Chest Pain;  COMPARISON: CT angiogram chest same day FINDINGS: Cardiac silhouette size is within normal limits.  No airspace consolidation.  No pleural fluid or pneumothorax.  No acute osseous abnormality.     No acute pulmonary process. Electronically signed by: Jose Andrade MD Date:    01/30/2020 Time:    06:58    Cta Chest Non-coronary (pe Study)    Result Date: 1/30/2020  Exam: CTA OF THE CHEST WITH CONTRAST Clinical data: Hypertension.  PE protocol. Technique: Axial CT angiography images through the lungs were acquired with contrast and imaged using soft tissue and lung algorithms. Coronal, sagittal, and 3D volume reconstructions were performed. Reformatted/3D-MPR images were performed.  Contrast used: Omnipaque 350.  Amount: 100 mL.  Radiation dose: CTDIvol = 28.5 mGy, DLP = 431.8 mGy x cm. Prior studies: No prior studies submitted. Findings: Lungs: No pulmonary infiltrate identified. No pulmonary mass identified. No pleural effusions identified. No pneumothorax. The airway is clear. Soft Tissues: No mediastinal, axillary or supraclavicular adenopathy is identified. Vascular: No filling defect within the pulmonary arteries to the segmental branch level. Unremarkable aorta. Grossly unremarkable sized heart.  No definite abnormality seen on 3D reformatted images. Bony structures: No acute or destructive abnormality Upper Abdomen: Limited visualization of the solid upper abdominal organs is grossly unremarkable. IMPRESSION: 1. No filling defect within the pulmonary arteries to the segmental branch level 2. No acute cardiopulmonary process. Recommendation:  Follow up as clinically indicated. All CT scans at this facility utilize dose modulation, iterative reconstruction, and/or weight based dosing when appropriate to reduce radiation dose to as low as reasonably achievable. Electronically Signed by JOAO BERRY MD at 1/30/2020 6:50:15 AM EST    Medications:  Reconciled Home Medications:      Medication List      START  taking these medications    aspirin 81 MG EC tablet  Commonly known as:  ECOTRIN  Take 1 tablet (81 mg total) by mouth once daily.  Start taking on:  February 1, 2020     atorvastatin 80 MG tablet  Commonly known as:  LIPITOR  Take 1 tablet (80 mg total) by mouth once daily.     Brilinta 90 mg tablet  Generic drug:  ticagrelor  Take one tablet twice a day.     metoprolol tartrate 25 MG tablet  Commonly known as:  LOPRESSOR  Take 0.5 tablets (12.5 mg total) by mouth 2 (two) times daily.        CHANGE how you take these medications    enalapril 20 MG tablet  Commonly known as:  VASOTEC  Take 1 tablet (20 mg total) by mouth once daily.  What changed:  when to take this        CONTINUE taking these medications    esomeprazole 40 MG capsule  Commonly known as:  NEXIUM  Take 40 mg by mouth before breakfast.        STOP taking these medications    ibuprofen 800 MG tablet  Commonly known as:  ADVIL,MOTRIN            Indwelling Lines/Drains at time of discharge:   Lines/Drains/Airways     None                 Time spent on the discharge of patient: 39 minutes  Patient was seen and examined on the date of discharge and determined to be suitable for discharge.         Kalpana Mathews MD  Department of Hospital Medicine  [facility]

## 2020-01-31 NOTE — ASSESSMENT & PLAN NOTE
Mild transaminitis.  History of prior alcoholism.  Repeat CMP in 1 week given starting high intensity statin.

## 2020-01-31 NOTE — DISCHARGE INSTRUCTIONS
Post Radial Angiogram Discharge Instructions    Keep puncture site covered with current bandage for 24 hours.    You may shower in 24 hours. Do not take a tub bath or submerge the puncture site in water for 72 hours.    You may cover the site with a Band-Aid. Keep Band-Aid clean and dry at all times.    No lifting over 5 pounds with the arm your puncture site is on for 72 hours.    No strenuous activity such as bowling, tennis, etc for 72 hours.   Do not operate lawnmower, motorcycle, chainsaw, or all terrain vehicle for 72 hours.    No blood pressure or tourniquets on affected arm for 72 hours.    The puncture site may be slightly bruised and sore following your procedure.    Drink 6-8 glasses of clear liquids during the next 24 hours to flush the dye out.     If Bleeding Occurs, DO NOT PANIC    Place 1 or 2 fingers over the puncture site and hold firm pressure to stop bleeding. You may be able to feel your pulse as you hold pressure.    Lift you fingers after 5 minutes to see if the bleeding stopped.    Once has stopped, gently wipe the wrist area clean and cover with a bandage.    If the bleeding does not stop after 30 minutes, or if there is a large amount of bleeding or spurting, call 911! Do not drive yourself to the hospital.     Should any of the following occur, Contact your Physician Immediately:    Redness, inflamation, swelling, chills or fever, or discolored drainage at procedure site.    Coldness, discoloration, ongoing numbness, severe pain, or swelling. Expect mild tingling of hand and tenderness at the puncture site for up to 3 days. If this persists or other symptoms develop, notify your physician.

## 2020-01-31 NOTE — ASSESSMENT & PLAN NOTE
Patient reports prior history of binge drinking, denies dependency.  Last drink consumed December 2019.

## 2020-01-31 NOTE — PROGRESS NOTES
Cardiac Rehab     Trace Bird [facility]   [id number]   1/31/2020         Cardiac Rehab Phase Taught: Phase 1    Teaching Method: Verbal, Written    Handouts: Cardiac Diet Pack, Cardiac Rehab Tear Sheet, Discount Medication Card, Heart Attack Booklet and Stent Card    Educational Videos: None    Understanding:  Learning indicated by feedback and Verbalize understanding    Comments: pt in bed, discussed MI, coronary stent, meds, diet, s/s to report, cardiac rehab. Pt voiced understanding. Pt states he may be interested in cardiac rehab. Will follow as outpt    Total Time Spent:30mins            Shaniqua Villalba RN

## 2020-01-31 NOTE — PLAN OF CARE
01/30/20 2038   PRE-TX-O2   O2 Device (Oxygen Therapy) nasal cannula   Flow (L/min) 1.5   Pulse Oximetry Type Continuous   $ Pulse Oximetry - Multiple Charge Pulse Oximetry - Multiple   Respiratory Evaluation   $ Care Plan Tech Time 15 min   Evaluation For New Orders   Admitting Diagnosis nstemi

## 2020-01-31 NOTE — ASSESSMENT & PLAN NOTE
Patient admits to prior Adderall use.  Adamantly denied current use.  Counseled about potential side effects especially with beta-blockers.

## 2020-01-31 NOTE — ASSESSMENT & PLAN NOTE
Status post left heart catheterization with 95% stenosis of proximal LAD status post successful placement of drug-eluting stent.  Aspirin, Brilinta low-dose beta-blocker, statin.  Outpatient cardiology follow-up in 1 week

## 2020-02-01 NOTE — NURSING
Discharge instructions and medications reviewed with patient.  He verbalized understanding.  All questions answered.  Belongings reconciled.  Discharged to private vehicle.

## 2020-02-02 LAB
AORTIC ROOT ANNULUS: 3.3 CM
AORTIC VALVE CUSP SEPERATION: 2.17 CM
AV INDEX (PROSTH): 0.88
AV MEAN GRADIENT: 4 MMHG
AV PEAK GRADIENT: 8 MMHG
AV VALVE AREA: 2.75 CM2
AV VELOCITY RATIO: 72.48
BSA FOR ECHO PROCEDURE: 2.02 M2
CV ECHO LV RWT: 0.64 CM
DOP CALC AO PEAK VEL: 1.43 M/S
DOP CALC AO VTI: 22.76 CM
DOP CALC LVOT AREA: 3.1 CM2
DOP CALC LVOT DIAMETER: 2 CM
DOP CALC LVOT PEAK VEL: 103.64 M/S
DOP CALC LVOT STROKE VOLUME: 62.55 CM3
DOP CALCLVOT PEAK VEL VTI: 19.92 CM
E WAVE DECELERATION TIME: 240.97 MSEC
E/A RATIO: 1.02
E/E' RATIO: 6.7 M/S
ECHO LV POSTERIOR WALL: 1.2 CM (ref 0.6–1.1)
FRACTIONAL SHORTENING: 33 % (ref 28–44)
INTERVENTRICULAR SEPTUM: 1.2 CM (ref 0.6–1.1)
IVRT: 70.45 MSEC
LEFT ATRIUM SIZE: 3.47 CM
LEFT INTERNAL DIMENSION IN SYSTOLE: 2.52 CM (ref 2.1–4)
LEFT VENTRICLE DIASTOLIC VOLUME INDEX: 58.41 ML/M2
LEFT VENTRICLE DIASTOLIC VOLUME: 116.3 ML
LEFT VENTRICLE MASS INDEX: 76 G/M2
LEFT VENTRICLE SYSTOLIC VOLUME INDEX: 22.1 ML/M2
LEFT VENTRICLE SYSTOLIC VOLUME: 44 ML
LEFT VENTRICULAR INTERNAL DIMENSION IN DIASTOLE: 3.77 CM (ref 3.5–6)
LEFT VENTRICULAR MASS: 151.44 G
LV LATERAL E/E' RATIO: 6.09 M/S
LV SEPTAL E/E' RATIO: 7.44 M/S
MV PEAK A VEL: 0.66 M/S
MV PEAK E VEL: 0.67 M/S
PV PEAK VELOCITY: 100.22 CM/S
RA PRESSURE: 3 MMHG
RIGHT VENTRICULAR END-DIASTOLIC DIMENSION: 248 CM
TDI LATERAL: 0.11 M/S
TDI SEPTAL: 0.09 M/S
TDI: 0.1 M/S

## 2020-02-03 ENCOUNTER — TELEPHONE (OUTPATIENT)
Dept: CARDIAC REHAB | Facility: HOSPITAL | Age: 35
End: 2020-02-03

## 2020-02-03 NOTE — TELEPHONE ENCOUNTER
Trace Bird [facility]   [id number]   2/3/2020         Spoke with: no answer    Received Medications?:not applicable    Follow Up Appt?:not applicable    Cardio Pulmonary Rehab Appt?:not applicable    Comments: no answer. Will retry call 02/04/20    Shaniqua Villalba RN

## 2020-02-04 ENCOUNTER — TELEPHONE (OUTPATIENT)
Dept: CARDIAC REHAB | Facility: HOSPITAL | Age: 35
End: 2020-02-04

## 2020-02-04 NOTE — TELEPHONE ENCOUNTER
Trace Bird [facility]   [id number]   2/4/2020         Spoke with: no answer    Received Medications?:not applicable    Follow Up Appt?:not applicable    Cardio Pulmonary Rehab Appt?:not applicable    Comments: no answer. Pt info sent to cardiac rehab for follow up    Shaniqua Villalba RN

## 2020-02-11 ENCOUNTER — CLINICAL SUPPORT (OUTPATIENT)
Dept: CARDIAC REHAB | Facility: HOSPITAL | Age: 35
End: 2020-02-11
Attending: INTERNAL MEDICINE
Payer: COMMERCIAL

## 2020-02-11 DIAGNOSIS — Z95.5 S/P CORONARY ARTERY STENT PLACEMENT: Primary | ICD-10-CM

## 2020-02-11 PROCEDURE — 93797 PHYS/QHP OP CAR RHAB WO ECG: CPT

## 2020-02-18 ENCOUNTER — CLINICAL SUPPORT (OUTPATIENT)
Dept: CARDIAC REHAB | Facility: HOSPITAL | Age: 35
End: 2020-02-18
Attending: INTERNAL MEDICINE
Payer: COMMERCIAL

## 2020-02-18 DIAGNOSIS — Z95.5 S/P CORONARY ARTERY STENT PLACEMENT: ICD-10-CM

## 2020-02-18 PROCEDURE — 93798 PHYS/QHP OP CAR RHAB W/ECG: CPT

## 2020-02-20 ENCOUNTER — CLINICAL SUPPORT (OUTPATIENT)
Dept: CARDIAC REHAB | Facility: HOSPITAL | Age: 35
End: 2020-02-20
Attending: INTERNAL MEDICINE
Payer: COMMERCIAL

## 2020-02-20 DIAGNOSIS — Z95.5 S/P CORONARY ARTERY STENT PLACEMENT: ICD-10-CM

## 2020-02-20 PROCEDURE — 93798 PHYS/QHP OP CAR RHAB W/ECG: CPT

## 2020-02-24 ENCOUNTER — CLINICAL SUPPORT (OUTPATIENT)
Dept: CARDIAC REHAB | Facility: HOSPITAL | Age: 35
End: 2020-02-24
Payer: COMMERCIAL

## 2020-02-24 DIAGNOSIS — Z95.5 S/P CORONARY ARTERY STENT PLACEMENT: ICD-10-CM

## 2020-02-24 PROCEDURE — 93798 PHYS/QHP OP CAR RHAB W/ECG: CPT

## 2020-02-25 RX ORDER — ATORVASTATIN CALCIUM 80 MG/1
TABLET, FILM COATED ORAL
Qty: 30 TABLET | Refills: 0 | OUTPATIENT
Start: 2020-02-25

## 2020-02-25 RX ORDER — METOPROLOL TARTRATE 25 MG/1
12.5 TABLET, FILM COATED ORAL 2 TIMES DAILY
Qty: 30 TABLET | Refills: 0 | OUTPATIENT
Start: 2020-02-25 | End: 2020-03-26

## 2020-02-25 RX ORDER — ASPIRIN 81 MG/1
TABLET ORAL
Qty: 30 TABLET | Refills: 0 | OUTPATIENT
Start: 2020-02-25

## 2020-03-02 ENCOUNTER — CLINICAL SUPPORT (OUTPATIENT)
Dept: CARDIAC REHAB | Facility: HOSPITAL | Age: 35
End: 2020-03-02
Attending: INTERNAL MEDICINE
Payer: COMMERCIAL

## 2020-03-02 DIAGNOSIS — Z95.5 S/P CORONARY ARTERY STENT PLACEMENT: ICD-10-CM

## 2020-03-02 PROCEDURE — 93798 PHYS/QHP OP CAR RHAB W/ECG: CPT

## 2020-03-05 ENCOUNTER — CLINICAL SUPPORT (OUTPATIENT)
Dept: CARDIAC REHAB | Facility: HOSPITAL | Age: 35
End: 2020-03-05
Attending: INTERNAL MEDICINE
Payer: COMMERCIAL

## 2020-03-05 DIAGNOSIS — Z95.5 S/P CORONARY ARTERY STENT PLACEMENT: ICD-10-CM

## 2020-03-05 PROCEDURE — 93798 PHYS/QHP OP CAR RHAB W/ECG: CPT

## 2020-03-06 ENCOUNTER — LAB VISIT (OUTPATIENT)
Dept: LAB | Facility: HOSPITAL | Age: 35
End: 2020-03-06
Attending: INTERNAL MEDICINE
Payer: COMMERCIAL

## 2020-03-06 DIAGNOSIS — I25.10 CORONARY ATHEROSCLEROSIS OF NATIVE CORONARY ARTERY: Primary | ICD-10-CM

## 2020-03-06 DIAGNOSIS — R94.5 NONSPECIFIC ABNORMAL RESULTS OF LIVER FUNCTION STUDY: ICD-10-CM

## 2020-03-06 LAB
ALBUMIN SERPL BCP-MCNC: 4.2 G/DL (ref 3.5–5.2)
ALP SERPL-CCNC: 57 U/L (ref 55–135)
ALT SERPL W/O P-5'-P-CCNC: 76 U/L (ref 10–44)
ANION GAP SERPL CALC-SCNC: 3 MMOL/L (ref 8–16)
AST SERPL-CCNC: 36 U/L (ref 10–40)
BASOPHILS # BLD AUTO: 0.04 K/UL (ref 0–0.2)
BASOPHILS NFR BLD: 0.5 % (ref 0–1.9)
BILIRUB SERPL-MCNC: 0.9 MG/DL (ref 0.1–1)
BUN SERPL-MCNC: 16 MG/DL (ref 6–20)
CALCIUM SERPL-MCNC: 9.3 MG/DL (ref 8.7–10.5)
CHLORIDE SERPL-SCNC: 104 MMOL/L (ref 95–110)
CO2 SERPL-SCNC: 31 MMOL/L (ref 23–29)
CREAT SERPL-MCNC: 1 MG/DL (ref 0.5–1.4)
DIFFERENTIAL METHOD: ABNORMAL
EOSINOPHIL # BLD AUTO: 0.2 K/UL (ref 0–0.5)
EOSINOPHIL NFR BLD: 2.7 % (ref 0–8)
ERYTHROCYTE [DISTWIDTH] IN BLOOD BY AUTOMATED COUNT: 12.3 % (ref 11.5–14.5)
EST. GFR  (AFRICAN AMERICAN): >60 ML/MIN/1.73 M^2
EST. GFR  (NON AFRICAN AMERICAN): >60 ML/MIN/1.73 M^2
GLUCOSE SERPL-MCNC: 107 MG/DL (ref 70–110)
HCT VFR BLD AUTO: 46.2 % (ref 40–54)
HGB BLD-MCNC: 15.9 G/DL (ref 14–18)
IMM GRANULOCYTES # BLD AUTO: 0.02 K/UL (ref 0–0.04)
IMM GRANULOCYTES NFR BLD AUTO: 0.3 % (ref 0–0.5)
LYMPHOCYTES # BLD AUTO: 3.1 K/UL (ref 1–4.8)
LYMPHOCYTES NFR BLD: 41.6 % (ref 18–48)
MAGNESIUM SERPL-MCNC: 1.8 MG/DL (ref 1.6–2.6)
MCH RBC QN AUTO: 31 PG (ref 27–31)
MCHC RBC AUTO-ENTMCNC: 34.4 G/DL (ref 32–36)
MCV RBC AUTO: 90 FL (ref 82–98)
MONOCYTES # BLD AUTO: 1.1 K/UL (ref 0.3–1)
MONOCYTES NFR BLD: 14.7 % (ref 4–15)
NEUTROPHILS # BLD AUTO: 3 K/UL (ref 1.8–7.7)
NEUTROPHILS NFR BLD: 40.2 % (ref 38–73)
NRBC BLD-RTO: 0 /100 WBC
PLATELET # BLD AUTO: 268 K/UL (ref 150–350)
PMV BLD AUTO: 9.5 FL (ref 9.2–12.9)
POTASSIUM SERPL-SCNC: 4.1 MMOL/L (ref 3.5–5.1)
PROT SERPL-MCNC: 6.9 G/DL (ref 6–8.4)
RBC # BLD AUTO: 5.13 M/UL (ref 4.6–6.2)
SODIUM SERPL-SCNC: 138 MMOL/L (ref 136–145)
WBC # BLD AUTO: 7.5 K/UL (ref 3.9–12.7)

## 2020-03-06 PROCEDURE — 36415 COLL VENOUS BLD VENIPUNCTURE: CPT

## 2020-03-06 PROCEDURE — 80053 COMPREHEN METABOLIC PANEL: CPT

## 2020-03-06 PROCEDURE — 85025 COMPLETE CBC W/AUTO DIFF WBC: CPT

## 2020-03-06 PROCEDURE — 83735 ASSAY OF MAGNESIUM: CPT

## 2020-03-09 ENCOUNTER — CLINICAL SUPPORT (OUTPATIENT)
Dept: CARDIAC REHAB | Facility: HOSPITAL | Age: 35
End: 2020-03-09
Attending: INTERNAL MEDICINE
Payer: COMMERCIAL

## 2020-03-09 DIAGNOSIS — Z95.5 S/P CORONARY ARTERY STENT PLACEMENT: ICD-10-CM

## 2020-03-09 PROCEDURE — 93798 PHYS/QHP OP CAR RHAB W/ECG: CPT

## 2020-06-02 ENCOUNTER — TELEPHONE (OUTPATIENT)
Dept: CARDIAC REHAB | Facility: HOSPITAL | Age: 35
End: 2020-06-02

## 2020-07-01 ENCOUNTER — OFFICE VISIT (OUTPATIENT)
Dept: PRIMARY CARE CLINIC | Facility: CLINIC | Age: 35
End: 2020-07-01
Payer: COMMERCIAL

## 2020-07-01 VITALS
OXYGEN SATURATION: 97 % | TEMPERATURE: 98 F | RESPIRATION RATE: 18 BRPM | SYSTOLIC BLOOD PRESSURE: 119 MMHG | DIASTOLIC BLOOD PRESSURE: 58 MMHG | HEART RATE: 55 BPM

## 2020-07-01 DIAGNOSIS — Z03.818 ENCOUNTER FOR OBSERVATION FOR SUSPECTED EXPOSURE TO OTHER BIOLOGICAL AGENTS RULED OUT: ICD-10-CM

## 2020-07-01 PROCEDURE — U0003 INFECTIOUS AGENT DETECTION BY NUCLEIC ACID (DNA OR RNA); SEVERE ACUTE RESPIRATORY SYNDROME CORONAVIRUS 2 (SARS-COV-2) (CORONAVIRUS DISEASE [COVID-19]), AMPLIFIED PROBE TECHNIQUE, MAKING USE OF HIGH THROUGHPUT TECHNOLOGIES AS DESCRIBED BY CMS-2020-01-R: HCPCS

## 2020-07-01 PROCEDURE — 99203 OFFICE O/P NEW LOW 30 MIN: CPT | Mod: S$GLB,,, | Performed by: EMERGENCY MEDICINE

## 2020-07-01 PROCEDURE — 99203 PR OFFICE/OUTPT VISIT, NEW, LEVL III, 30-44 MIN: ICD-10-PCS | Mod: S$GLB,,, | Performed by: EMERGENCY MEDICINE

## 2020-07-01 NOTE — PROGRESS NOTES
Subjective:      HPI Patient is a 34 y.o. male who presents to the ED 07/01/2020 with a chief complaint of exposure to COVID-19.  Patient is asymptomatic.  Patient states a family member in his household is tested positive and he would like to get tested.  He has a past medical history of CAD and stents.         Review of Systems   Constitutional: Negative for activity change, appetite change, fatigue and fever.   HENT: Negative for congestion, rhinorrhea and sore throat.    Respiratory: Negative for cough, chest tightness, shortness of breath and wheezing.    Cardiovascular: Negative for chest pain and palpitations.   Gastrointestinal: Negative for diarrhea, nausea and vomiting.   Musculoskeletal: Negative for arthralgias and myalgias.   Skin: Negative for rash.   Neurological: Negative for weakness, light-headedness and headaches.         Objective:      Physical Exam  Vitals signs and nursing note reviewed.   Constitutional:       General: He is not in acute distress.     Appearance: He is well-developed. He is not diaphoretic.   HENT:      Head: Normocephalic and atraumatic.      Nose: Nose normal.   Eyes:      Conjunctiva/sclera: Conjunctivae normal.   Neck:      Musculoskeletal: Normal range of motion.   Cardiovascular:      Rate and Rhythm: Normal rate and regular rhythm.      Heart sounds: Normal heart sounds. No murmur.   Pulmonary:      Effort: Pulmonary effort is normal. No respiratory distress.      Breath sounds: Normal breath sounds. No wheezing.   Musculoskeletal: Normal range of motion.   Skin:     General: Skin is warm and dry.   Neurological:      Mental Status: He is alert and oriented to person, place, and time.         Assessment:       1. Exposure to COVID- 19 Virus  Plan:       1. Exposure to COVID - 19 Virus  - COVID-19 Routine Screening    2. Discharge home and await results.   3. Return to clinic or ED for new or worsening symptoms.   4. Follow-up with PCP as needed.     Scribe  Attestation:   I, Selena Arreola, am scribing for, and in the presence of, CARLI Garcia. I performed the above scribed service and the documentation accurately describes the services I performed. I attest to the accuracy of the note.    I, CARLI Garcia, personally performed the services described in this documentation. All medical record entries made by the scribe were at my direction and in my presence.  I have reviewed the chart and agree that the record reflects my personal performance and is accurate and complete. CARLI Garcia.  2:54 PM 07/01/2020

## 2020-07-01 NOTE — PATIENT INSTRUCTIONS

## 2020-07-02 LAB — SARS-COV-2 RNA RESP QL NAA+PROBE: NOT DETECTED

## 2020-12-21 ENCOUNTER — OFFICE VISIT (OUTPATIENT)
Dept: CARDIOLOGY | Facility: CLINIC | Age: 35
End: 2020-12-21
Payer: COMMERCIAL

## 2020-12-21 VITALS
WEIGHT: 199 LBS | HEIGHT: 68 IN | SYSTOLIC BLOOD PRESSURE: 120 MMHG | BODY MASS INDEX: 30.16 KG/M2 | RESPIRATION RATE: 16 BRPM | HEART RATE: 65 BPM | DIASTOLIC BLOOD PRESSURE: 70 MMHG | OXYGEN SATURATION: 94 %

## 2020-12-21 DIAGNOSIS — E78.5 DYSLIPIDEMIA: ICD-10-CM

## 2020-12-21 DIAGNOSIS — I10 ESSENTIAL HYPERTENSION: Chronic | ICD-10-CM

## 2020-12-21 DIAGNOSIS — I25.10 CORONARY ARTERY DISEASE INVOLVING NATIVE CORONARY ARTERY OF NATIVE HEART WITHOUT ANGINA PECTORIS: ICD-10-CM

## 2020-12-21 DIAGNOSIS — Z95.5 S/P CORONARY ARTERY STENT PLACEMENT: Primary | ICD-10-CM

## 2020-12-21 PROCEDURE — 3008F BODY MASS INDEX DOCD: CPT | Mod: CPTII,S$GLB,, | Performed by: INTERNAL MEDICINE

## 2020-12-21 PROCEDURE — 99213 OFFICE O/P EST LOW 20 MIN: CPT | Mod: S$GLB,,, | Performed by: INTERNAL MEDICINE

## 2020-12-21 PROCEDURE — 1126F AMNT PAIN NOTED NONE PRSNT: CPT | Mod: S$GLB,,, | Performed by: INTERNAL MEDICINE

## 2020-12-21 PROCEDURE — 1126F PR PAIN SEVERITY QUANTIFIED, NO PAIN PRESENT: ICD-10-PCS | Mod: S$GLB,,, | Performed by: INTERNAL MEDICINE

## 2020-12-21 PROCEDURE — 99213 PR OFFICE/OUTPT VISIT, EST, LEVL III, 20-29 MIN: ICD-10-PCS | Mod: S$GLB,,, | Performed by: INTERNAL MEDICINE

## 2020-12-21 PROCEDURE — 3008F PR BODY MASS INDEX (BMI) DOCUMENTED: ICD-10-PCS | Mod: CPTII,S$GLB,, | Performed by: INTERNAL MEDICINE

## 2021-02-10 PROBLEM — I25.10 CORONARY ARTERY DISEASE INVOLVING NATIVE CORONARY ARTERY OF NATIVE HEART WITHOUT ANGINA PECTORIS: Status: ACTIVE | Noted: 2021-02-10

## 2021-04-29 ENCOUNTER — PATIENT MESSAGE (OUTPATIENT)
Dept: RESEARCH | Facility: HOSPITAL | Age: 36
End: 2021-04-29

## 2021-05-21 RX ORDER — METOPROLOL TARTRATE 25 MG/1
12.5 TABLET, FILM COATED ORAL 2 TIMES DAILY
Qty: 90 TABLET | Refills: 0 | Status: SHIPPED | OUTPATIENT
Start: 2021-05-21 | End: 2021-08-14

## 2022-05-17 NOTE — Clinical Note
Catheter is removed from the.  Willy Sensing  : 1946  Primary: Peggy Scott Medicare Complete  Secondary:  [facility] @ [address], [place] HARV[place].  [phone]   [phone]      OUTPATIENT PHYSICAL THERAPY: Daily Treatment Note 2022   ICD-10: Treatment Diagnosis: Pain in right knee (M25.561) and Stiffness of right knee, not elsewhere classified (M25.661) and Muscle weakness (generalized) (M62.81) and Other abnormalities of gait and mobility (R26.89)     R TKA 3/25/22  Effective Dates: 2022 TO 2022 (90 days). Frequency/Duration: 2-3 times a week for 90 Days  GOALS: (Goals have been discussed and agreed upon with patient.)  Short-Term Functional Goals: Time Frame: 4 weeks  1. Pt to report compliance with HEP - MET  2. Pt to restore 110-10 AROM to prevent manipulation - ongoing  3. Pt to normalize gait mechanics level surfaces - ongoing  Discharge Goals: Time Frame: 12 weeks  1. Pt to restore AROM to 120 flex for normal function without compensation - ongoing  2. Pt to increase strength for sit to stand x 30 reps - ongoing with correct alignment  3. Pt to increase strength for reciprocal gait on stairs - ongoing  4. Pt to increase SLS > 20 sec B for safe amb all surfaces - ongoing  _______________________________________________________________________________  Pre-treatment Symptoms/Complaints:  I think it may be doing better. It is always hard to tell. Pain: Initial: 2/10 Post Session:  No increased pain after ex's.    Medications Last Reviewed:  2022    Updated Objective Findings:  22    ROM:         AROM  Knee flex-ext L 110-10, R 110-15 before, 118-10 after 22  Functional Mobility:         Gait/Ambulation:  Antalgic with lat trunk motion        Transfers:  can perform without UE assist but abducts R hip and doesn't have control to the chair        Stairs:  Step-to pattern  Balance:          20 sec L, 6 sec R    KOOS:  raw score 16, interval score 47.487  5/5/22:  Raw score 10, interval score 61.583     TREATMENT:   THERAPEUTIC ACTIVITY: ( see below for minutes): Therapeutic activities per grid below to improve mobility. Required moderate verbal and manual cues to improve functional mobility . THERAPEUTIC EXERCISE: (see below for minutes):  Exercises per grid below to improve strength. Required moderate verbal and manual cues to promote proper body alignment, promote proper body posture, promote proper body mechanics and promote proper body breathing techniques. Progressed resistance, range, repetitions and complexity of movement as indicated. MANUAL THERAPY: (see below for minutes): Joint mobilization and Soft tissue mobilization was utilized and necessary because of the patient's restricted joint motion, painful spasm, loss of articular motion and restricted motion of soft tissue. MODALITIES: (see below for minutes):      for pain modulation    AQUATIC THERAPY (see below for minutes): Aquatic treatment performed per flow grid for Decreased muscle strength, Decreased endurance, Decreased static/dynamic balance and reactive control, Decreased activity endurance, Decompression, Ease of movement and Low impact and reduced weight bearing activity.     Date: 5/5/22  Visit 8  PN 5/10/22  Visit 9 5/12/22  Visit 10 5/17/22  Visit 11       Modalities: 15 mins 15 mins 15 mins 15 mins       Game ready R knee  seated seated seated seated                  Manual Therapy:                                 Aquatics Activities: 60 mins 60 mins 60 mins 45 mins       GAIT (F/B/S/M) 4 laps 4 laps 4 laps 4 laps       SLR gait 4 laps 4 laps 4 laps 4 laps       Hamstring Curl gait  4 laps 4 laps 4 laps 4 laps       Rockettes 4 laps 4 laps 4 laps 4 laps       Squats X 30  On step X 30  On step X 30  On step X 30  On step       Calf raises X 30  On step X 30  On step X 30  On step X 30  On step       Hamstring stretch B 3 x 15 sec 3 x 15 sec 3 x 15 sec 3 x 15 sec Flex stretch at rail  3 x 15 sec 3 x 15 sec 3 x 15 sec 3 x 15 sec       Step ups ant B X 30 X 30 X 30 X 30       SLS B X 3 X 3 X 3 X 3       Deep well 5 mins 5 mins 5 mins 5 mins                             Therapeutic Exercises: 10 mins 15 mins 15 mins 15 mins       Pt education, postural education, HEP, functional breathing           Bike for ROM  15 mins 15 mins 15 mins       HEP: see hand out    Reading Room Portal  Treatment/Session Summary:    · Response to Treatment: Excellent ROM gains pre and post therapy today. Able to descend pool stairs smoothly today due to the increased ROM. Good effort and consistency. · Communication/Consultation:  None today  · Equipment provided today:  None today  · Recommendations/Intent for next treatment session: Next visit will focus on stretching, strengthening, modalities as indicated.     Total Treatment Billable Duration:  75 mins  PT Patient Time In/Time Out  Time In: 0130  Time Out: 0300    Dania Lemons, JOYCE    Future Appointments   Date Time Provider Susana Victoria   5/19/2022  1:30 PM Luiz Healy, PT [facility]   5/24/2022  4:00 PM Luiz Healy, PT [facility]   5/26/2022  1:30 PM Luiz Healy, PT [facility]   3/14/2023  8:96 AM SFD CT 59 SLICE UNIT 1 [facility]

## 2022-10-01 ENCOUNTER — OFFICE VISIT (OUTPATIENT)
Dept: URGENT CARE | Facility: CLINIC | Age: 37
End: 2022-10-01
Payer: COMMERCIAL

## 2022-10-01 VITALS
HEART RATE: 94 BPM | DIASTOLIC BLOOD PRESSURE: 75 MMHG | RESPIRATION RATE: 16 BRPM | TEMPERATURE: 100 F | WEIGHT: 204 LBS | OXYGEN SATURATION: 95 % | BODY MASS INDEX: 31.02 KG/M2 | SYSTOLIC BLOOD PRESSURE: 133 MMHG

## 2022-10-01 DIAGNOSIS — R05.9 COUGH, UNSPECIFIED TYPE: Primary | ICD-10-CM

## 2022-10-01 DIAGNOSIS — J06.9 UPPER RESPIRATORY TRACT INFECTION, UNSPECIFIED TYPE: ICD-10-CM

## 2022-10-01 DIAGNOSIS — R68.83 CHILLS: ICD-10-CM

## 2022-10-01 DIAGNOSIS — J20.9 ACUTE BRONCHITIS, UNSPECIFIED ORGANISM: ICD-10-CM

## 2022-10-01 LAB
CTP QC/QA: YES
CTP QC/QA: YES
FLUAV AG NPH QL: NEGATIVE
FLUBV AG NPH QL: NEGATIVE
SARS-COV-2 AG RESP QL IA.RAPID: NEGATIVE

## 2022-10-01 PROCEDURE — 3008F BODY MASS INDEX DOCD: CPT | Mod: S$GLB,,, | Performed by: STUDENT IN AN ORGANIZED HEALTH CARE EDUCATION/TRAINING PROGRAM

## 2022-10-01 PROCEDURE — 4010F PR ACE/ARB THEARPY RXD/TAKEN: ICD-10-PCS | Mod: S$GLB,,, | Performed by: STUDENT IN AN ORGANIZED HEALTH CARE EDUCATION/TRAINING PROGRAM

## 2022-10-01 PROCEDURE — 87804 POCT INFLUENZA A/B: ICD-10-PCS | Mod: QW,,, | Performed by: STUDENT IN AN ORGANIZED HEALTH CARE EDUCATION/TRAINING PROGRAM

## 2022-10-01 PROCEDURE — 99204 OFFICE O/P NEW MOD 45 MIN: CPT | Mod: 25,S$GLB,, | Performed by: STUDENT IN AN ORGANIZED HEALTH CARE EDUCATION/TRAINING PROGRAM

## 2022-10-01 PROCEDURE — 96372 THER/PROPH/DIAG INJ SC/IM: CPT | Mod: S$GLB,,, | Performed by: STUDENT IN AN ORGANIZED HEALTH CARE EDUCATION/TRAINING PROGRAM

## 2022-10-01 PROCEDURE — 1159F PR MEDICATION LIST DOCUMENTED IN MEDICAL RECORD: ICD-10-PCS | Mod: S$GLB,,, | Performed by: STUDENT IN AN ORGANIZED HEALTH CARE EDUCATION/TRAINING PROGRAM

## 2022-10-01 PROCEDURE — 1159F MED LIST DOCD IN RCRD: CPT | Mod: S$GLB,,, | Performed by: STUDENT IN AN ORGANIZED HEALTH CARE EDUCATION/TRAINING PROGRAM

## 2022-10-01 PROCEDURE — 3008F PR BODY MASS INDEX (BMI) DOCUMENTED: ICD-10-PCS | Mod: S$GLB,,, | Performed by: STUDENT IN AN ORGANIZED HEALTH CARE EDUCATION/TRAINING PROGRAM

## 2022-10-01 PROCEDURE — 87811 SARS CORONAVIRUS 2 ANTIGEN POCT, MANUAL READ: ICD-10-PCS | Mod: QW,S$GLB,, | Performed by: STUDENT IN AN ORGANIZED HEALTH CARE EDUCATION/TRAINING PROGRAM

## 2022-10-01 PROCEDURE — 87804 INFLUENZA ASSAY W/OPTIC: CPT | Mod: QW,,, | Performed by: STUDENT IN AN ORGANIZED HEALTH CARE EDUCATION/TRAINING PROGRAM

## 2022-10-01 PROCEDURE — 1160F RVW MEDS BY RX/DR IN RCRD: CPT | Mod: S$GLB,,, | Performed by: STUDENT IN AN ORGANIZED HEALTH CARE EDUCATION/TRAINING PROGRAM

## 2022-10-01 PROCEDURE — 4010F ACE/ARB THERAPY RXD/TAKEN: CPT | Mod: S$GLB,,, | Performed by: STUDENT IN AN ORGANIZED HEALTH CARE EDUCATION/TRAINING PROGRAM

## 2022-10-01 PROCEDURE — 96372 PR INJECTION,THERAP/PROPH/DIAG2ST, IM OR SUBCUT: ICD-10-PCS | Mod: S$GLB,,, | Performed by: STUDENT IN AN ORGANIZED HEALTH CARE EDUCATION/TRAINING PROGRAM

## 2022-10-01 PROCEDURE — 87811 SARS-COV-2 COVID19 W/OPTIC: CPT | Mod: QW,S$GLB,, | Performed by: STUDENT IN AN ORGANIZED HEALTH CARE EDUCATION/TRAINING PROGRAM

## 2022-10-01 PROCEDURE — 99204 PR OFFICE/OUTPT VISIT, NEW, LEVL IV, 45-59 MIN: ICD-10-PCS | Mod: 25,S$GLB,, | Performed by: STUDENT IN AN ORGANIZED HEALTH CARE EDUCATION/TRAINING PROGRAM

## 2022-10-01 PROCEDURE — 1160F PR REVIEW ALL MEDS BY PRESCRIBER/CLIN PHARMACIST DOCUMENTED: ICD-10-PCS | Mod: S$GLB,,, | Performed by: STUDENT IN AN ORGANIZED HEALTH CARE EDUCATION/TRAINING PROGRAM

## 2022-10-01 RX ORDER — ALBUTEROL SULFATE 90 UG/1
1-2 AEROSOL, METERED RESPIRATORY (INHALATION) EVERY 6 HOURS PRN
Qty: 18 G | Refills: 0 | Status: SHIPPED | OUTPATIENT
Start: 2022-10-01 | End: 2023-01-12

## 2022-10-01 RX ORDER — CEFTRIAXONE 1 G/1
1 INJECTION, POWDER, FOR SOLUTION INTRAMUSCULAR; INTRAVENOUS
Status: COMPLETED | OUTPATIENT
Start: 2022-10-01 | End: 2022-10-01

## 2022-10-01 RX ORDER — DOXYCYCLINE HYCLATE 100 MG
100 TABLET ORAL 2 TIMES DAILY
Qty: 20 TABLET | Refills: 0 | Status: SHIPPED | OUTPATIENT
Start: 2022-10-01 | End: 2022-10-11

## 2022-10-01 RX ORDER — PROMETHAZINE HYDROCHLORIDE AND DEXTROMETHORPHAN HYDROBROMIDE 6.25; 15 MG/5ML; MG/5ML
5 SYRUP ORAL
Qty: 118 ML | Refills: 0 | Status: SHIPPED | OUTPATIENT
Start: 2022-10-01 | End: 2022-10-11

## 2022-10-01 RX ORDER — DEXAMETHASONE SODIUM PHOSPHATE 4 MG/ML
8 INJECTION, SOLUTION INTRA-ARTICULAR; INTRALESIONAL; INTRAMUSCULAR; INTRAVENOUS; SOFT TISSUE
Status: COMPLETED | OUTPATIENT
Start: 2022-10-01 | End: 2022-10-01

## 2022-10-01 RX ORDER — PREDNISONE 20 MG/1
40 TABLET ORAL DAILY
Qty: 8 TABLET | Refills: 0 | Status: SHIPPED | OUTPATIENT
Start: 2022-10-02 | End: 2022-10-06

## 2022-10-01 RX ADMIN — DEXAMETHASONE SODIUM PHOSPHATE 8 MG: 4 INJECTION, SOLUTION INTRA-ARTICULAR; INTRALESIONAL; INTRAMUSCULAR; INTRAVENOUS; SOFT TISSUE at 03:10

## 2022-10-01 RX ADMIN — CEFTRIAXONE 1 G: 1 INJECTION, POWDER, FOR SOLUTION INTRAMUSCULAR; INTRAVENOUS at 03:10

## 2022-10-01 NOTE — PROGRESS NOTES
Subjective:       Patient ID: Trace Bird III is a 37 y.o. male.    Vitals:  weight is 92.5 kg (204 lb). His oral temperature is 99.6 °F (37.6 °C). His blood pressure is 133/75 and his pulse is 94. His respiration is 16 and oxygen saturation is 95%.     Chief Complaint: Cough    Patient is a 37-year-old male with a past medical history of hypertension, hyperlipidemia, coronary artery disease, and GERD who presents to clinic for evaluation of cough and congestion.  Patient reports he is vaccinated.  Patient reports family sick with similar episodes.  Patient reports also unknown sick exposures as he works as a .  Patient states he does wear his mask on calls.  Patient states he has taken over-the-counter medications with some relief of symptoms.  Patient states he has experienced symptoms for approximately 3-4 weeks however they become worse over the past 3-4 days.  Patient states that he has experienced fatigue, chills, night sweats, nasal sinus congestion with postnasal drainage, sinus pressure, sore throat, productive cough, wheezing, generalized body aches, and headaches.  Patient denies any acute dizziness, ear pain, drooling, painful or difficulty swallowing, chest pain or palpitations, dyspnea on exertion, shortness of breath, leg swelling, orthopnea, abdominal pain, nausea or vomiting, diarrhea, rash, or change in mentation.    Cough  This is a new problem. The current episode started 1 to 4 weeks ago. The problem has been gradually worsening. The problem occurs constantly. The cough is Productive of sputum. Associated symptoms include chills, headaches, myalgias, postnasal drip, a sore throat (Reports scratchy throat) and wheezing. Pertinent negatives include no chest pain, ear pain, rash or shortness of breath.     Constitution: Positive for chills, sweating and fatigue.   HENT:  Positive for congestion, postnasal drip, sinus pressure and sore throat (Reports scratchy throat). Negative for ear  pain, drooling and trouble swallowing.    Neck: neck negative.   Cardiovascular: Negative.  Negative for chest pain, leg swelling, palpitations and sob on exertion.   Eyes: Negative.    Respiratory:  Positive for cough, sputum production and wheezing. Negative for chest tightness and shortness of breath.    Gastrointestinal: Negative.  Negative for abdominal pain, nausea, vomiting and diarrhea.   Endocrine: negative.   Genitourinary: Negative.    Musculoskeletal:  Positive for muscle ache.   Skin: Negative.  Negative for color change, pale, rash and erythema.   Allergic/Immunologic: Negative.    Neurological:  Positive for headaches. Negative for dizziness, light-headedness, passing out, disorientation and altered mental status.   Hematologic/Lymphatic: Negative.    Psychiatric/Behavioral: Negative.  Negative for altered mental status, disorientation and confusion.      Objective:      Physical Exam   Constitutional: He is oriented to person, place, and time. He appears well-developed. He is cooperative.  Non-toxic appearance. He appears ill. No distress.   HENT:   Head: Normocephalic and atraumatic.   Ears:   Right Ear: Hearing, tympanic membrane, external ear and ear canal normal.   Left Ear: Hearing, tympanic membrane, external ear and ear canal normal.   Nose: Rhinorrhea and congestion present. No mucosal edema or nasal deformity. No epistaxis. Right sinus exhibits no maxillary sinus tenderness and no frontal sinus tenderness. Left sinus exhibits no maxillary sinus tenderness and no frontal sinus tenderness.   Mouth/Throat: Uvula is midline and mucous membranes are normal. Mucous membranes are moist. No trismus in the jaw. Normal dentition. No uvula swelling. Posterior oropharyngeal erythema present. No oropharyngeal exudate. Oropharynx is clear.   Eyes: Conjunctivae and lids are normal. Pupils are equal, round, and reactive to light. Right eye exhibits no discharge. Left eye exhibits no discharge. No scleral  icterus.   Neck: Trachea normal and phonation normal. Neck supple. No neck rigidity present.   Cardiovascular: Normal rate, regular rhythm, normal heart sounds and normal pulses.   Pulmonary/Chest: Effort normal and breath sounds normal. No respiratory distress (Unlabored.  Equal rise and fall of chest.  Able speak in full complete sentences.  No adventitious breath sounds noted.). He has no wheezes. He has no rhonchi. He has no rales.   Abdominal: Normal appearance and bowel sounds are normal. He exhibits no distension and no mass. Soft. There is no abdominal tenderness.   Musculoskeletal: Normal range of motion.         General: No deformity. Normal range of motion.      Cervical back: He exhibits no tenderness.   Lymphadenopathy:     He has no cervical adenopathy.   Neurological: He is alert and oriented to person, place, and time. He exhibits normal muscle tone. Coordination normal.   Skin: Skin is warm, dry, intact, not diaphoretic and not pale. Capillary refill takes less than 2 seconds. No erythema   Psychiatric: His speech is normal and behavior is normal. Judgment and thought content normal.   Nursing note and vitals reviewed.      Assessment:       1. Cough, unspecified type    2. Chills    3. Upper respiratory tract infection, unspecified type    4. Acute bronchitis, unspecified organism          Plan:         Cough, unspecified type  -     SARS Coronavirus 2 Antigen, POCT Manual Read  -     POCT Influenza A/B  -     XR CHEST PA AND LATERAL; Future; Expected date: 10/01/2022    Chills  -     SARS Coronavirus 2 Antigen, POCT Manual Read  -     POCT Influenza A/B    Upper respiratory tract infection, unspecified type    Acute bronchitis, unspecified organism    Other orders  -     cefTRIAXone injection 1 g  -     dexamethasone injection 8 mg  -     doxycycline (VIBRA-TABS) 100 MG tablet; Take 1 tablet (100 mg total) by mouth 2 (two) times daily. for 10 days  Dispense: 20 tablet; Refill: 0  -     predniSONE  (DELTASONE) 20 MG tablet; Take 2 tablets (40 mg total) by mouth once daily. for 4 days  Dispense: 8 tablet; Refill: 0  -     promethazine-dextromethorphan (PROMETHAZINE-DM) 6.25-15 mg/5 mL Syrp; Take 5 mLs by mouth every 4 to 6 hours as needed (Cough).  Dispense: 118 mL; Refill: 0  -     albuterol (VENTOLIN HFA) 90 mcg/actuation inhaler; Inhale 1-2 puffs into the lungs every 6 (six) hours as needed for Wheezing or Shortness of Breath. Rescue  Dispense: 18 g; Refill: 0               Labs:  COVID negative.  Influenza A and B negative.    Chest x-ray: The lungs are clear, with normal appearance of pulmonary vasculature and no pleural effusion or pneumothorax. The cardiac silhouette is normal in size. The hilar and mediastinal contours are unremarkable. Bones are intact.  Rocephin 1 g IM in clinic.  Decadron 8 mg IM in clinic.  Patient tolerated well.  No complications noted.  Take medications as prescribed.    Tylenol/Motrin per package instructions for any pain or fever.    Assure adequate hydration.    Follow-up with PCP in 1-2 days.    Return to clinic as needed.    To ED for any new acutely worsening symptoms.    Patient in agreement with plan of care.    DISCLAIMER: Please note that my documentation in this Electronic Healthcare Record was produced using speech recognition software and therefore may contain errors related to that software system.These could include grammar, punctuation and spelling errors or the inclusion/exclusion of phrases that were not intended. Garbled syntax, mangled pronouns, and other bizarre constructions may be attributed to that software system.

## 2023-01-12 ENCOUNTER — TELEPHONE (OUTPATIENT)
Dept: FAMILY MEDICINE | Facility: CLINIC | Age: 38
End: 2023-01-12

## 2023-01-12 ENCOUNTER — OFFICE VISIT (OUTPATIENT)
Dept: FAMILY MEDICINE | Facility: CLINIC | Age: 38
End: 2023-01-12
Payer: COMMERCIAL

## 2023-01-12 VITALS
HEART RATE: 61 BPM | RESPIRATION RATE: 16 BRPM | HEIGHT: 68 IN | DIASTOLIC BLOOD PRESSURE: 74 MMHG | SYSTOLIC BLOOD PRESSURE: 110 MMHG | BODY MASS INDEX: 32.07 KG/M2 | OXYGEN SATURATION: 97 % | WEIGHT: 211.63 LBS

## 2023-01-12 DIAGNOSIS — Z95.5 HX OF PLACEMENT OF STENT IN ANTERIOR DESCENDING BRANCH OF LEFT CORONARY ARTERY: ICD-10-CM

## 2023-01-12 DIAGNOSIS — Z79.899 ENCOUNTER FOR LONG-TERM (CURRENT) USE OF OTHER MEDICATIONS: ICD-10-CM

## 2023-01-12 DIAGNOSIS — D64.9 ANEMIA, UNSPECIFIED TYPE: ICD-10-CM

## 2023-01-12 DIAGNOSIS — N63.0 BREAST MASS IN MALE: ICD-10-CM

## 2023-01-12 DIAGNOSIS — I25.10 CORONARY ARTERY DISEASE INVOLVING NATIVE CORONARY ARTERY OF NATIVE HEART WITHOUT ANGINA PECTORIS: Primary | ICD-10-CM

## 2023-01-12 DIAGNOSIS — E78.5 HYPERLIPIDEMIA WITH LOW HDL: ICD-10-CM

## 2023-01-12 DIAGNOSIS — R73.02 IGT (IMPAIRED GLUCOSE TOLERANCE): ICD-10-CM

## 2023-01-12 DIAGNOSIS — E78.6 HYPERLIPIDEMIA WITH LOW HDL: ICD-10-CM

## 2023-01-12 DIAGNOSIS — F41.1 GAD (GENERALIZED ANXIETY DISORDER): ICD-10-CM

## 2023-01-12 DIAGNOSIS — E03.9 HYPOTHYROIDISM, UNSPECIFIED TYPE: ICD-10-CM

## 2023-01-12 DIAGNOSIS — Z11.59 NEED FOR HEPATITIS C SCREENING TEST: ICD-10-CM

## 2023-01-12 DIAGNOSIS — E78.00 HYPERCHOLESTEROLEMIA: ICD-10-CM

## 2023-01-12 DIAGNOSIS — Z95.5 S/P CORONARY ARTERY STENT PLACEMENT: ICD-10-CM

## 2023-01-12 PROCEDURE — 3078F DIAST BP <80 MM HG: CPT | Mod: S$GLB,,, | Performed by: INTERNAL MEDICINE

## 2023-01-12 PROCEDURE — 3008F PR BODY MASS INDEX (BMI) DOCUMENTED: ICD-10-PCS | Mod: S$GLB,,, | Performed by: INTERNAL MEDICINE

## 2023-01-12 PROCEDURE — 3078F PR MOST RECENT DIASTOLIC BLOOD PRESSURE < 80 MM HG: ICD-10-PCS | Mod: S$GLB,,, | Performed by: INTERNAL MEDICINE

## 2023-01-12 PROCEDURE — 99205 PR OFFICE/OUTPT VISIT, NEW, LEVL V, 60-74 MIN: ICD-10-PCS | Mod: S$GLB,,, | Performed by: INTERNAL MEDICINE

## 2023-01-12 PROCEDURE — 3074F SYST BP LT 130 MM HG: CPT | Mod: S$GLB,,, | Performed by: INTERNAL MEDICINE

## 2023-01-12 PROCEDURE — 1159F MED LIST DOCD IN RCRD: CPT | Mod: S$GLB,,, | Performed by: INTERNAL MEDICINE

## 2023-01-12 PROCEDURE — 99205 OFFICE O/P NEW HI 60 MIN: CPT | Mod: S$GLB,,, | Performed by: INTERNAL MEDICINE

## 2023-01-12 PROCEDURE — 1159F PR MEDICATION LIST DOCUMENTED IN MEDICAL RECORD: ICD-10-PCS | Mod: S$GLB,,, | Performed by: INTERNAL MEDICINE

## 2023-01-12 PROCEDURE — 3008F BODY MASS INDEX DOCD: CPT | Mod: S$GLB,,, | Performed by: INTERNAL MEDICINE

## 2023-01-12 PROCEDURE — 3074F PR MOST RECENT SYSTOLIC BLOOD PRESSURE < 130 MM HG: ICD-10-PCS | Mod: S$GLB,,, | Performed by: INTERNAL MEDICINE

## 2023-01-12 RX ORDER — BUSPIRONE HYDROCHLORIDE 10 MG/1
10 TABLET ORAL
COMMUNITY
Start: 2023-01-06 | End: 2023-01-24

## 2023-01-12 RX ORDER — PAROXETINE 10 MG/1
10 TABLET, FILM COATED ORAL EVERY MORNING
Qty: 30 TABLET | Refills: 11 | Status: SHIPPED | OUTPATIENT
Start: 2023-01-12 | End: 2023-07-12 | Stop reason: ALTCHOICE

## 2023-01-12 NOTE — PROGRESS NOTES
Subjective:       Patient ID: Trace Bird III is a 37 y.o. male.    Chief Complaint: Establish Care (Pt presents to the clinic for establish care, PT is having anxiety and b/p issues. /Pt has a lump under left breast. /Pt had a heart attack in 2020/Pt previously saw Dr. Lindsey for a cardiologist but is retiring.)    Patient presents today to establish care under myself.      Review of the chart has been carried out though limited because of the fact that some of his care was provided a [facility] we do not have access to those records.      I have had a long discussion with the patient with regards to the need to be aggressive about his early coronary disease with aggressive secondary prevention to be carried out.      Mainly have discussed with him the fact that though the Brilinta was changed to clopidogrel mainly because of cost there is a potential that he might have a genetic limitation to being able to convert the clopidogrel from the prodrug to the active ingredient which would increase the risk of him having an in-stent thrombosis with resultant acute myocardial injury.  For this reason have recommended for him to have a pharmacogenomics panel at the time of lab work at this facility next week.  That will let us know whether he has the proper cytochrome P2C19 enzyme system to be able to get full benefit from the clopidogrel.  If that is not the case, we have more than enough reason to get the patient switched to prasugrel.      At best that he can recollect he had a " maker"where he had a single stent placed in the left anterior descending artery by Dr. Lindsey.  Patient did not bring his stent card with him but will do so next week the time of the follow-up appointment that we can take a picture on uploaded into the  for the sake of record keeping.  He mentions that he is had nuclear medicine studies on echocardiography that the area of the heart that was affected by the  "ST-elevation myocardial infarction appeared to be moving well after the intervention and that he would "he will on his own".    Being that this happened as such an early age at 34 years old, patient lives with the fear that every discomfort in his chest could be related to another event.  He also says that he has been having significant anxiety relating to this which is affecting his quality of life.  He was tried on buspirone which has not being effective.  I recommended for him to discontinue the drug now on we will start a trial of Paxil 10 mg every day.    He is still gainfully employed as a .      On further questioning he is had a mass on the left breast since his puberty and he has already been worked up with ultrasonography showing that it was a benign etiology.  Now he is gotten older it is more tender and he says that he can not tolerate the weight of his baby on his chest on that side because it is so tender.  I have recommended for this to be evaluated and treated by General surgery and consultation to Dr. Stafford will be placed today.  They will call him with regards to final scheduling details.  At time my examination is difficult for me to be able to make the size of the mass.  It is easily movable and is not exquisitely tender.  There are no obvious changes in the skin or areola on that side.  Patient said he does not have any on the right side.    Medication list has been reviewed and there is no need for refills at this time.      I have had a long discussion with him with regards to the newer LDL guidelines which would recommend that a patient like him has a goal of an LDL equal to or less than 50 possible.  Diet and therapeutic lifestyle changes have been stressed he was probably need more aggressive pharmacotherapy to be able to achieve that goal.    Plans are for him to return to the office fasting on January 18, 2023 to have a CBC, comprehensive metabolic panel, lipid panel, TSH, " pharmacogenomics panel, hepatitis-C screening and hemoglobin A1c with a follow-up appointment to discuss all these findings.  Needless to say if there is anything that is considered to be a panic value the patient will be contacted immediately.    Review of Systems   All other systems reviewed and are negative.      Objective:      Physical Exam  Vitals and nursing note reviewed.   Constitutional:       General: He is not in acute distress.     Appearance: Normal appearance. He is obese. He is not ill-appearing, toxic-appearing or diaphoretic.   HENT:      Head: Normocephalic and atraumatic.   Cardiovascular:      Rate and Rhythm: Normal rate and regular rhythm.      Pulses: Normal pulses.      Heart sounds: Normal heart sounds. No murmur heard.    No gallop.   Pulmonary:      Effort: Pulmonary effort is normal.      Breath sounds: Normal breath sounds.   Musculoskeletal:      Right lower leg: No edema.      Left lower leg: No edema.   Skin:     General: Skin is warm and dry.      Coloration: Skin is not jaundiced or pale.   Neurological:      General: No focal deficit present.      Mental Status: He is alert and oriented to person, place, and time. Mental status is at baseline.      Cranial Nerves: No cranial nerve deficit.      Sensory: No sensory deficit.      Motor: No weakness.      Coordination: Coordination normal.      Gait: Gait normal.   Psychiatric:         Mood and Affect: Mood normal.         Behavior: Behavior normal.         Thought Content: Thought content normal.         Judgment: Judgment normal.       Assessment:       Problem List Items Addressed This Visit          Cardiac/Vascular    Coronary artery disease involving native coronary artery of native heart without angina pectoris - Primary    S/P coronary artery stent placement     Other Visit Diagnoses       Anemia, unspecified type        Relevant Orders    CBC Auto Differential    Encounter for long-term (current) use of other medications         Relevant Orders    Comprehensive Metabolic Panel    Hypothyroidism, unspecified type        Relevant Orders    TSH    Hypercholesterolemia        Relevant Orders    Lipid Panel    IGT (impaired glucose tolerance)        Relevant Orders    Hemoglobin A1C    Need for hepatitis C screening test        Relevant Orders    Hepatitis C Antibody    Breast mass in male        Relevant Orders    Ambulatory referral/consult to General Surgery    PAOLA (generalized anxiety disorder)        Relevant Medications    paroxetine (PAXIL) 10 MG tablet    Hx of placement of stent in anterior descending branch of left coronary artery        Relevant Orders    Pharmacogenomics Panel              Plan:       We spent over 60 minutes discussing his history, current treatment and plan of action and plans are as discussed in the HPI.      Patient is to return to the clinic fasting on January 19, 2023 for a CBC, comprehensive metabolic panel, TSH, hemoglobin A1c, hepatitis-C screening, and a pharmacogenomic panel to address the possibility of  in sensitivity today clopidogrel.  Depending on the findings of the specific tests patient might be switch from clopidogrel to prasugrel.    His cardiologist, Dr. Lindsey, is retiring so he will be referred to another cardiology to continue follow-up.      Patient is to get on his portal on gather the information lab work wise included a [place] [facility] and will also try and get information regarding his nuclear medicine studies and echocardiogram.    Vital signs remained stable at this time on current therapy and patient is asymptomatic from the angina standpoint.      Regarding his anxiety he has more than enough reasons to feel like that and will be started on Paxil 10 mg every day with a prescription for 30 of them as a trial.  He can discontinue buspirone altogether since he was not helping at all.    There are no signs of bleeding complication on the anti-platelet therapy as now.      He is to  bring the stent card at the time of the next appointment so that we can make a copy on uploaded into the  for safe keeping.      Patient suffers from low HDL with a value of 33.     Referral will be made to Dr. Gonzalez with the patient to be established for continued cardiovascular follow-up.  They will call the patient with regards to final scheduling details.

## 2023-01-12 NOTE — TELEPHONE ENCOUNTER
Called and spoke to patient to request that he bring in his stent card when he has labs so that it may be copied and added to his chart. Patient verbalized understanding. VILMA Jackson

## 2023-01-13 ENCOUNTER — TELEPHONE (OUTPATIENT)
Dept: FAMILY MEDICINE | Facility: CLINIC | Age: 38
End: 2023-01-13
Payer: COMMERCIAL

## 2023-01-16 ENCOUNTER — TELEPHONE (OUTPATIENT)
Dept: FAMILY MEDICINE | Facility: CLINIC | Age: 38
End: 2023-01-16
Payer: COMMERCIAL

## 2023-01-17 ENCOUNTER — TELEPHONE (OUTPATIENT)
Dept: SURGERY | Facility: CLINIC | Age: 38
End: 2023-01-17
Payer: COMMERCIAL

## 2023-01-17 NOTE — TELEPHONE ENCOUNTER
[facility]: 1/27/2023 1045  [facility].

## 2023-01-24 ENCOUNTER — LAB VISIT (OUTPATIENT)
Dept: FAMILY MEDICINE | Facility: CLINIC | Age: 38
End: 2023-01-24
Payer: COMMERCIAL

## 2023-01-24 ENCOUNTER — OFFICE VISIT (OUTPATIENT)
Dept: FAMILY MEDICINE | Facility: CLINIC | Age: 38
End: 2023-01-24
Payer: COMMERCIAL

## 2023-01-24 VITALS
SYSTOLIC BLOOD PRESSURE: 136 MMHG | OXYGEN SATURATION: 99 % | HEART RATE: 106 BPM | HEIGHT: 68 IN | DIASTOLIC BLOOD PRESSURE: 84 MMHG | BODY MASS INDEX: 31.74 KG/M2 | RESPIRATION RATE: 18 BRPM | WEIGHT: 209.44 LBS

## 2023-01-24 DIAGNOSIS — E03.9 HYPOTHYROIDISM, UNSPECIFIED TYPE: ICD-10-CM

## 2023-01-24 DIAGNOSIS — Z11.59 NEED FOR HEPATITIS C SCREENING TEST: ICD-10-CM

## 2023-01-24 DIAGNOSIS — I25.10 CORONARY ARTERY DISEASE INVOLVING NATIVE CORONARY ARTERY OF NATIVE HEART WITHOUT ANGINA PECTORIS: ICD-10-CM

## 2023-01-24 DIAGNOSIS — T50.905A DRUG SIDE EFFECTS, INITIAL ENCOUNTER: ICD-10-CM

## 2023-01-24 DIAGNOSIS — F52.32 ANORGASMIA OF MALE: ICD-10-CM

## 2023-01-24 DIAGNOSIS — R73.02 IGT (IMPAIRED GLUCOSE TOLERANCE): ICD-10-CM

## 2023-01-24 DIAGNOSIS — D64.9 ANEMIA, UNSPECIFIED TYPE: ICD-10-CM

## 2023-01-24 DIAGNOSIS — F41.1 GAD (GENERALIZED ANXIETY DISORDER): Primary | ICD-10-CM

## 2023-01-24 DIAGNOSIS — Z95.5 S/P CORONARY ARTERY STENT PLACEMENT: ICD-10-CM

## 2023-01-24 DIAGNOSIS — I10 ESSENTIAL HYPERTENSION: Chronic | ICD-10-CM

## 2023-01-24 DIAGNOSIS — Z79.899 ENCOUNTER FOR LONG-TERM (CURRENT) USE OF OTHER MEDICATIONS: ICD-10-CM

## 2023-01-24 DIAGNOSIS — E78.5 DYSLIPIDEMIA: ICD-10-CM

## 2023-01-24 DIAGNOSIS — E78.00 HYPERCHOLESTEROLEMIA: ICD-10-CM

## 2023-01-24 LAB
ALBUMIN SERPL BCP-MCNC: 4.1 G/DL (ref 3.5–5.2)
ALP SERPL-CCNC: 45 U/L (ref 55–135)
ALT SERPL W/O P-5'-P-CCNC: 63 U/L (ref 10–44)
ANION GAP SERPL CALC-SCNC: 7 MMOL/L (ref 8–16)
AST SERPL-CCNC: 33 U/L (ref 10–40)
BASOPHILS # BLD AUTO: 0.07 K/UL (ref 0–0.2)
BASOPHILS NFR BLD: 1.2 % (ref 0–1.9)
BILIRUB SERPL-MCNC: 0.5 MG/DL (ref 0.1–1)
BUN SERPL-MCNC: 16 MG/DL (ref 6–20)
CALCIUM SERPL-MCNC: 9.7 MG/DL (ref 8.7–10.5)
CHLORIDE SERPL-SCNC: 104 MMOL/L (ref 95–110)
CHOLEST SERPL-MCNC: 73 MG/DL (ref 120–199)
CHOLEST/HDLC SERPL: 2.5 {RATIO} (ref 2–5)
CO2 SERPL-SCNC: 25 MMOL/L (ref 23–29)
CREAT SERPL-MCNC: 1 MG/DL (ref 0.5–1.4)
DIFFERENTIAL METHOD: ABNORMAL
EOSINOPHIL # BLD AUTO: 0.1 K/UL (ref 0–0.5)
EOSINOPHIL NFR BLD: 2.3 % (ref 0–8)
ERYTHROCYTE [DISTWIDTH] IN BLOOD BY AUTOMATED COUNT: 12.2 % (ref 11.5–14.5)
EST. GFR  (NO RACE VARIABLE): >60 ML/MIN/1.73 M^2
GLUCOSE SERPL-MCNC: 108 MG/DL (ref 70–110)
HCT VFR BLD AUTO: 46.1 % (ref 40–54)
HDLC SERPL-MCNC: 29 MG/DL (ref 40–75)
HDLC SERPL: 39.7 % (ref 20–50)
HGB BLD-MCNC: 15.6 G/DL (ref 14–18)
IMM GRANULOCYTES # BLD AUTO: 0.01 K/UL (ref 0–0.04)
IMM GRANULOCYTES NFR BLD AUTO: 0.2 % (ref 0–0.5)
LDLC SERPL CALC-MCNC: 37.8 MG/DL (ref 63–159)
LYMPHOCYTES # BLD AUTO: 2.9 K/UL (ref 1–4.8)
LYMPHOCYTES NFR BLD: 49.2 % (ref 18–48)
MCH RBC QN AUTO: 31 PG (ref 27–31)
MCHC RBC AUTO-ENTMCNC: 33.8 G/DL (ref 32–36)
MCV RBC AUTO: 92 FL (ref 82–98)
MONOCYTES # BLD AUTO: 0.7 K/UL (ref 0.3–1)
MONOCYTES NFR BLD: 12.1 % (ref 4–15)
NEUTROPHILS # BLD AUTO: 2.1 K/UL (ref 1.8–7.7)
NEUTROPHILS NFR BLD: 35 % (ref 38–73)
NONHDLC SERPL-MCNC: 44 MG/DL
NRBC BLD-RTO: 0 /100 WBC
PLATELET # BLD AUTO: 290 K/UL (ref 150–450)
PMV BLD AUTO: 9.9 FL (ref 9.2–12.9)
POTASSIUM SERPL-SCNC: 4.3 MMOL/L (ref 3.5–5.1)
PROT SERPL-MCNC: 6.8 G/DL (ref 6–8.4)
RBC # BLD AUTO: 5.03 M/UL (ref 4.6–6.2)
SODIUM SERPL-SCNC: 136 MMOL/L (ref 136–145)
TRIGL SERPL-MCNC: 31 MG/DL (ref 30–150)
TSH SERPL DL<=0.005 MIU/L-ACNC: 0.64 UIU/ML (ref 0.4–4)
WBC # BLD AUTO: 5.96 K/UL (ref 3.9–12.7)

## 2023-01-24 PROCEDURE — 3075F SYST BP GE 130 - 139MM HG: CPT | Mod: S$GLB,,, | Performed by: INTERNAL MEDICINE

## 2023-01-24 PROCEDURE — 99213 PR OFFICE/OUTPT VISIT, EST, LEVL III, 20-29 MIN: ICD-10-PCS | Mod: S$GLB,,, | Performed by: INTERNAL MEDICINE

## 2023-01-24 PROCEDURE — 80061 LIPID PANEL: CPT | Performed by: INTERNAL MEDICINE

## 2023-01-24 PROCEDURE — 3079F DIAST BP 80-89 MM HG: CPT | Mod: S$GLB,,, | Performed by: INTERNAL MEDICINE

## 2023-01-24 PROCEDURE — 99213 OFFICE O/P EST LOW 20 MIN: CPT | Mod: S$GLB,,, | Performed by: INTERNAL MEDICINE

## 2023-01-24 PROCEDURE — 3075F PR MOST RECENT SYSTOLIC BLOOD PRESS GE 130-139MM HG: ICD-10-PCS | Mod: S$GLB,,, | Performed by: INTERNAL MEDICINE

## 2023-01-24 PROCEDURE — 3079F PR MOST RECENT DIASTOLIC BLOOD PRESSURE 80-89 MM HG: ICD-10-PCS | Mod: S$GLB,,, | Performed by: INTERNAL MEDICINE

## 2023-01-24 PROCEDURE — 1159F PR MEDICATION LIST DOCUMENTED IN MEDICAL RECORD: ICD-10-PCS | Mod: S$GLB,,, | Performed by: INTERNAL MEDICINE

## 2023-01-24 PROCEDURE — 84443 ASSAY THYROID STIM HORMONE: CPT | Performed by: INTERNAL MEDICINE

## 2023-01-24 PROCEDURE — 86803 HEPATITIS C AB TEST: CPT | Performed by: INTERNAL MEDICINE

## 2023-01-24 PROCEDURE — 3008F PR BODY MASS INDEX (BMI) DOCUMENTED: ICD-10-PCS | Mod: S$GLB,,, | Performed by: INTERNAL MEDICINE

## 2023-01-24 PROCEDURE — 80053 COMPREHEN METABOLIC PANEL: CPT | Performed by: INTERNAL MEDICINE

## 2023-01-24 PROCEDURE — 1159F MED LIST DOCD IN RCRD: CPT | Mod: S$GLB,,, | Performed by: INTERNAL MEDICINE

## 2023-01-24 PROCEDURE — 3008F BODY MASS INDEX DOCD: CPT | Mod: S$GLB,,, | Performed by: INTERNAL MEDICINE

## 2023-01-24 PROCEDURE — 85025 COMPLETE CBC W/AUTO DIFF WBC: CPT | Performed by: INTERNAL MEDICINE

## 2023-01-24 PROCEDURE — 83036 HEMOGLOBIN GLYCOSYLATED A1C: CPT | Performed by: INTERNAL MEDICINE

## 2023-01-24 NOTE — PROGRESS NOTES
Subjective:       Patient ID: Trace Bird III is a 37 y.o. male.    Chief Complaint: No chief complaint on file.    HPI  Review of Systems      Objective:      Physical Exam    Assessment:       Problem List Items Addressed This Visit    None  Visit Diagnoses       Anemia, unspecified type        Encounter for long-term (current) use of other medications        Hypothyroidism, unspecified type        Hypercholesterolemia        IGT (impaired glucose tolerance)        Need for hepatitis C screening test                  Plan:       t

## 2023-01-24 NOTE — PROGRESS NOTES
Subjective:       Patient ID: Trace Bird III is a 37 y.o. male.    Chief Complaint: Follow-up (Pt presents to the clinic for a medication issue with Paxil, pt states the medication is working perfectly except he is having issues with ejaculation. Pt states his father passed away 1/18/23)    Feel like the Paxil at 10 milligrams is helping but unfortunately he is developing significant sexual dysfunction from the medication and we are going to modify the therapy to try and get the benefits and no the side effects.  At this point he has not been on the medication long he really has not even seen the potential for either side effects and or maximal benefit.  I recommended for him to hold the medication at least for 48 hours and then decrease it to 5 milligrams every day to see if we can continue getting the benefit on the generalized anxiety disorder without the sexual dysfunction that he is experiencing.      He was seen at [facility] because of episodic chest pain with all the workup being unremarkable.      He had missed the appointment for his lab work for the wellness exam as planned so instead today will do the lab work to include a CBC, comprehensive metabolic panel, TSH, hemoglobin A1c and hepatitis-C testing.  Report will be available in the next 6 hours and if anything is grossly abnormal he will be contacted otherwise the appointment for his wellness examination is to be rescheduled today.      Vital signs are stable.      At time my auscultation is high-risk by down to the mid 60s.      Is grieving well the passing of his father back on January 18, 2023.    Care gaps were not addressed each time.          Review of Systems   All other systems reviewed and are negative.      Objective:      Physical Exam  Vitals and nursing note reviewed.   Constitutional:       General: He is not in acute distress.     Appearance: Normal appearance. He is obese. He is not ill-appearing, toxic-appearing or  diaphoretic.   HENT:      Head: Normocephalic and atraumatic.   Cardiovascular:      Rate and Rhythm: Normal rate and regular rhythm.      Pulses: Normal pulses.      Heart sounds: Normal heart sounds. No murmur heard.  Pulmonary:      Effort: Pulmonary effort is normal.      Breath sounds: Normal breath sounds.   Musculoskeletal:      Right lower leg: No edema.      Left lower leg: No edema.   Skin:     General: Skin is warm and dry.      Coloration: Skin is not jaundiced or pale.   Neurological:      General: No focal deficit present.      Mental Status: He is alert and oriented to person, place, and time. Mental status is at baseline.      Cranial Nerves: No cranial nerve deficit.      Sensory: No sensory deficit.      Motor: No weakness.      Coordination: Coordination normal.      Gait: Gait normal.   Psychiatric:         Mood and Affect: Mood normal.         Behavior: Behavior normal.         Thought Content: Thought content normal.         Judgment: Judgment normal.       Assessment:       Problem List Items Addressed This Visit          Cardiac/Vascular    Essential hypertension (Chronic)    Coronary artery disease involving native coronary artery of native heart without angina pectoris    Dyslipidemia    S/P coronary artery stent placement     Other Visit Diagnoses       PAOLA (generalized anxiety disorder)    -  Primary    Drug side effects, initial encounter        Anorgasmia of male                Plan:       Patient will be having his lab work for the wellness examination which will be scheduled now.      He is benefiting from the Paxil with regards to generalized anxiety disorder symptomatology but unfortunately cause anorgasmia reason why the medication is to be held for 48 hours and then will be restarted at half of a 10 milligram dose.  Patient is to contact me with any concerns.      Otherwise he will follow-up with me for the wellness exam as planned.      The workup at the emergency room at  [facility] after the episode of chest pain rule out the possibility of an acute ischemic injury.      Plan of action will be modified according to the lab work findings will be available to us in the next 6 hours.  If anything is grossly abnormal patient will be contacted immediately.    Pt is well aware of the signs and symptoms to watch for and to seek medical attention in case these appear

## 2023-01-25 LAB
ESTIMATED AVG GLUCOSE: 120 MG/DL (ref 68–131)
HBA1C MFR BLD: 5.8 % (ref 4–5.6)
HCV AB SERPL QL IA: NORMAL

## 2023-01-27 ENCOUNTER — OFFICE VISIT (OUTPATIENT)
Dept: SURGERY | Facility: CLINIC | Age: 38
End: 2023-01-27
Payer: COMMERCIAL

## 2023-01-27 VITALS
HEART RATE: 63 BPM | DIASTOLIC BLOOD PRESSURE: 63 MMHG | HEIGHT: 68 IN | WEIGHT: 209.44 LBS | TEMPERATURE: 99 F | SYSTOLIC BLOOD PRESSURE: 116 MMHG | BODY MASS INDEX: 31.74 KG/M2 | RESPIRATION RATE: 16 BRPM

## 2023-01-27 DIAGNOSIS — N63.0 BREAST MASS IN MALE: ICD-10-CM

## 2023-01-27 DIAGNOSIS — N63.41 SUBAREOLAR MASS OF RIGHT BREAST: Primary | ICD-10-CM

## 2023-01-27 PROCEDURE — 1159F PR MEDICATION LIST DOCUMENTED IN MEDICAL RECORD: ICD-10-PCS | Mod: CPTII,S$GLB,, | Performed by: SURGERY

## 2023-01-27 PROCEDURE — 3078F PR MOST RECENT DIASTOLIC BLOOD PRESSURE < 80 MM HG: ICD-10-PCS | Mod: CPTII,S$GLB,, | Performed by: SURGERY

## 2023-01-27 PROCEDURE — 1159F MED LIST DOCD IN RCRD: CPT | Mod: CPTII,S$GLB,, | Performed by: SURGERY

## 2023-01-27 PROCEDURE — 99203 PR OFFICE/OUTPT VISIT, NEW, LEVL III, 30-44 MIN: ICD-10-PCS | Mod: S$GLB,,, | Performed by: SURGERY

## 2023-01-27 PROCEDURE — 3044F PR MOST RECENT HEMOGLOBIN A1C LEVEL <7.0%: ICD-10-PCS | Mod: CPTII,S$GLB,, | Performed by: SURGERY

## 2023-01-27 PROCEDURE — 3008F BODY MASS INDEX DOCD: CPT | Mod: CPTII,S$GLB,, | Performed by: SURGERY

## 2023-01-27 PROCEDURE — 3044F HG A1C LEVEL LT 7.0%: CPT | Mod: CPTII,S$GLB,, | Performed by: SURGERY

## 2023-01-27 PROCEDURE — 3074F PR MOST RECENT SYSTOLIC BLOOD PRESSURE < 130 MM HG: ICD-10-PCS | Mod: CPTII,S$GLB,, | Performed by: SURGERY

## 2023-01-27 PROCEDURE — 3008F PR BODY MASS INDEX (BMI) DOCUMENTED: ICD-10-PCS | Mod: CPTII,S$GLB,, | Performed by: SURGERY

## 2023-01-27 PROCEDURE — 3078F DIAST BP <80 MM HG: CPT | Mod: CPTII,S$GLB,, | Performed by: SURGERY

## 2023-01-27 PROCEDURE — 99203 OFFICE O/P NEW LOW 30 MIN: CPT | Mod: S$GLB,,, | Performed by: SURGERY

## 2023-01-27 PROCEDURE — 3074F SYST BP LT 130 MM HG: CPT | Mod: CPTII,S$GLB,, | Performed by: SURGERY

## 2023-01-27 PROCEDURE — 99999 PR PBB SHADOW E&M-EST. PATIENT-LVL IV: CPT | Mod: PBBFAC,,, | Performed by: SURGERY

## 2023-01-27 PROCEDURE — 99999 PR PBB SHADOW E&M-EST. PATIENT-LVL IV: ICD-10-PCS | Mod: PBBFAC,,, | Performed by: SURGERY

## 2023-01-27 NOTE — H&P
GENERAL SURGERY  OUTPATIENT H&P    REASON FOR VISIT/CC: Left breast mass    HPI: Trace Bird III is a 37 y.o. male who presents for evaluation of chronic left breast mass. Patient reports since hitting tubular around age 17 he is had a palpable mass behind the nipple on the left breast. He reports it is not changed significantly incised the recently has become somewhat more bothersome and occasionally tender.  Has no overlying skin changes, nipple drainage or nipple retraction. No lesion on the right side.  Has not started any new medications.  No significant drug use.  Is currently on Plavix due to coronary artery disease status post stenting approximally 2 years ago. No significant family history of breast or ovarian cancer is noted.    I have reviewed the patient's chart including prior progress notes, procedures and testing.     ROS:   Review of Systems   All other systems reviewed and are negative.    PROBLEM LIST:  Patient Active Problem List   Diagnosis    GERD (gastroesophageal reflux disease)    Transaminitis    Nicotine abuse    Essential hypertension    S/P coronary artery stent placement    Dyslipidemia    Coronary artery disease involving native coronary artery of native heart without angina pectoris         HISTORY  Past Medical History:   Diagnosis Date    Coronary artery disease     GERD (gastroesophageal reflux disease)     Helicobacter pylori (H. pylori)     Hypertension     ST elevation (STEMI) myocardial infarction of unspecified site        Past Surgical History:   Procedure Laterality Date    ANGIOGRAM, CORONARY, WITH LEFT HEART CATHETERIZATION N/A 01/30/2020    Procedure: Angiogram, Coronary, with Left Heart Cath;  Surgeon: Félix Shaikh MD;  Location: [facility] CATH/EP LAB;  Service: Cardiology;  Laterality: N/A;    CORONARY ANGIOPLASTY WITH STENT PLACEMENT      HERNIA REPAIR         Social History     Tobacco Use    Smoking status: Former     Packs/day: 1.00     Types: Cigarettes      Quit date: 2021     Years since quittin.0    Smokeless tobacco: Never   Substance Use Topics    Alcohol use: Not Currently     Comment: Alcohol abuse, stopped since December 15, 2019    Drug use: Not Currently       Family History   Problem Relation Age of Onset    Hypertension Mother     Diabetes Father     Hypertension Father          MEDS:  Current Outpatient Medications on File Prior to Visit   Medication Sig Dispense Refill    clopidogreL (PLAVIX) 75 mg tablet Take 75 mg by mouth once daily.      esomeprazole (NEXIUM) 40 MG capsule Take 40 mg by mouth before breakfast.      metoprolol tartrate (LOPRESSOR) 25 MG tablet TAKE 0.5 TABLETS (12.5 MG TOTAL) BY MOUTH 2 (TWO) TIMES DAILY. 90 tablet 0    paroxetine (PAXIL) 10 MG tablet Take 1 tablet (10 mg total) by mouth every morning. 30 tablet 11    aspirin (ECOTRIN) 81 MG EC tablet Take 1 tablet (81 mg total) by mouth once daily. 30 tablet 0    atorvastatin (LIPITOR) 80 MG tablet Take 1 tablet (80 mg total) by mouth once daily. 30 tablet 0    enalapril (VASOTEC) 20 MG tablet Take 1 tablet (20 mg total) by mouth once daily. 30 tablet 0     No current facility-administered medications on file prior to visit.       ALLERGIES:  Review of patient's allergies indicates:  No Known Allergies      VITALS:  Vitals:    23 1057   BP: 116/63   Pulse: 63   Resp: 16   Temp: 98.9 °F (37.2 °C)         PHYSICAL EXAM:  Physical Exam  Vitals reviewed.   Constitutional:       General: He is not in acute distress.     Appearance: Normal appearance. He is well-developed.   HENT:      Head: Normocephalic and atraumatic.   Eyes:      General: No scleral icterus.  Neck:      Trachea: No tracheal deviation.   Cardiovascular:      Rate and Rhythm: Normal rate and regular rhythm.      Pulses: Normal pulses.   Pulmonary:      Effort: Pulmonary effort is normal. No respiratory distress.      Breath sounds: Normal breath sounds.   Chest:   Breasts:     Right: No swelling, inverted  nipple, mass, nipple discharge, skin change or tenderness.      Left: Mass present. No inverted nipple, nipple discharge, skin change or tenderness.          Comments: Rubbery mass measuring least 4 x 4 cm superior to the nipple, not fixed  Abdominal:      General: There is no distension.      Palpations: Abdomen is soft.      Tenderness: There is no abdominal tenderness.   Musculoskeletal:         General: No swelling or tenderness. Normal range of motion.      Cervical back: Normal range of motion and neck supple. No rigidity.   Lymphadenopathy:      Upper Body:      Right upper body: No supraclavicular or axillary adenopathy.      Left upper body: No supraclavicular or axillary adenopathy.   Skin:     General: Skin is warm and dry.      Coloration: Skin is not jaundiced.      Findings: No erythema.   Neurological:      General: No focal deficit present.      Mental Status: He is alert and oriented to person, place, and time. He is not disoriented.      Motor: No weakness or abnormal muscle tone.   Psychiatric:         Mood and Affect: Mood normal.         Behavior: Behavior normal.         Thought Content: Thought content normal.         Judgment: Judgment normal.         LABS:  Lab Results   Component Value Date    WBC 5.96 01/24/2023    RBC 5.03 01/24/2023    HGB 15.6 01/24/2023    HCT 46.1 01/24/2023     01/24/2023     Lab Results   Component Value Date     01/24/2023     01/24/2023    K 4.3 01/24/2023     01/24/2023    CO2 25 01/24/2023    BUN 16 01/24/2023    CREATININE 1.0 01/24/2023    CALCIUM 9.7 01/24/2023     Lab Results   Component Value Date    ALT 63 (H) 01/24/2023    AST 33 01/24/2023    ALKPHOS 45 (L) 01/24/2023    BILITOT 0.5 01/24/2023     Lab Results   Component Value Date    MG 1.8 03/06/2020    PHOS 4.7 (H) 01/31/2020       STUDIES:        ASSESSMENT & PLAN:  37 y.o. male with left breast mass  -chronic lesion though becoming somewhat more symptomatic  -never had  previous imaging  -will obtain mammogram and ultrasound to determine if biopsy is required  -if consistent with gynecomastia could offer surgical resection as there was no obvious reversible etiology  -will contact patient with results of imaging

## 2023-01-30 ENCOUNTER — OFFICE VISIT (OUTPATIENT)
Dept: FAMILY MEDICINE | Facility: CLINIC | Age: 38
End: 2023-01-30
Payer: COMMERCIAL

## 2023-01-30 VITALS
BODY MASS INDEX: 31.67 KG/M2 | WEIGHT: 209 LBS | DIASTOLIC BLOOD PRESSURE: 78 MMHG | SYSTOLIC BLOOD PRESSURE: 124 MMHG | OXYGEN SATURATION: 97 % | HEART RATE: 82 BPM | RESPIRATION RATE: 16 BRPM | HEIGHT: 68 IN

## 2023-01-30 DIAGNOSIS — Z00.00 ROUTINE GENERAL MEDICAL EXAMINATION AT A HEALTH CARE FACILITY: Primary | ICD-10-CM

## 2023-01-30 DIAGNOSIS — Z23 NEED FOR TDAP VACCINATION: ICD-10-CM

## 2023-01-30 DIAGNOSIS — R73.02 IMPAIRED GLUCOSE TOLERANCE: ICD-10-CM

## 2023-01-30 DIAGNOSIS — E88.09 CYP3A4 DEFICIENCY: ICD-10-CM

## 2023-01-30 DIAGNOSIS — R74.01 TRANSAMINITIS: ICD-10-CM

## 2023-01-30 PROCEDURE — 99395 PREV VISIT EST AGE 18-39: CPT | Mod: 25,S$GLB,, | Performed by: INTERNAL MEDICINE

## 2023-01-30 PROCEDURE — 3078F DIAST BP <80 MM HG: CPT | Mod: S$GLB,,, | Performed by: INTERNAL MEDICINE

## 2023-01-30 PROCEDURE — 90471 IMMUNIZATION ADMIN: CPT | Mod: S$GLB,,, | Performed by: INTERNAL MEDICINE

## 2023-01-30 PROCEDURE — 90471 TDAP VACCINE GREATER THAN OR EQUAL TO 7YO IM: ICD-10-PCS | Mod: S$GLB,,, | Performed by: INTERNAL MEDICINE

## 2023-01-30 PROCEDURE — 3008F PR BODY MASS INDEX (BMI) DOCUMENTED: ICD-10-PCS | Mod: S$GLB,,, | Performed by: INTERNAL MEDICINE

## 2023-01-30 PROCEDURE — 3044F HG A1C LEVEL LT 7.0%: CPT | Mod: S$GLB,,, | Performed by: INTERNAL MEDICINE

## 2023-01-30 PROCEDURE — 3074F SYST BP LT 130 MM HG: CPT | Mod: S$GLB,,, | Performed by: INTERNAL MEDICINE

## 2023-01-30 PROCEDURE — 90715 TDAP VACCINE GREATER THAN OR EQUAL TO 7YO IM: ICD-10-PCS | Mod: S$GLB,,, | Performed by: INTERNAL MEDICINE

## 2023-01-30 PROCEDURE — 3078F PR MOST RECENT DIASTOLIC BLOOD PRESSURE < 80 MM HG: ICD-10-PCS | Mod: S$GLB,,, | Performed by: INTERNAL MEDICINE

## 2023-01-30 PROCEDURE — 3008F BODY MASS INDEX DOCD: CPT | Mod: S$GLB,,, | Performed by: INTERNAL MEDICINE

## 2023-01-30 PROCEDURE — 3074F PR MOST RECENT SYSTOLIC BLOOD PRESSURE < 130 MM HG: ICD-10-PCS | Mod: S$GLB,,, | Performed by: INTERNAL MEDICINE

## 2023-01-30 PROCEDURE — 1159F PR MEDICATION LIST DOCUMENTED IN MEDICAL RECORD: ICD-10-PCS | Mod: S$GLB,,, | Performed by: INTERNAL MEDICINE

## 2023-01-30 PROCEDURE — 1159F MED LIST DOCD IN RCRD: CPT | Mod: S$GLB,,, | Performed by: INTERNAL MEDICINE

## 2023-01-30 PROCEDURE — 90715 TDAP VACCINE 7 YRS/> IM: CPT | Mod: S$GLB,,, | Performed by: INTERNAL MEDICINE

## 2023-01-30 PROCEDURE — 3044F PR MOST RECENT HEMOGLOBIN A1C LEVEL <7.0%: ICD-10-PCS | Mod: S$GLB,,, | Performed by: INTERNAL MEDICINE

## 2023-01-30 PROCEDURE — 99395 PR PREVENTIVE VISIT,EST,18-39: ICD-10-PCS | Mod: 25,S$GLB,, | Performed by: INTERNAL MEDICINE

## 2023-01-30 NOTE — PROGRESS NOTES
SUBJECTIVE:    Patient ID: Trace Bird III is a 37 y.o. male who presents for annual physical.     Patient presents for his yearly wellness examination based on fasting laboratory evaluation obtained at this facility January 24, 2023.  Reports are available at time of exam will be discussed with the patient at length.      With regards to thyroid function testing is unremarkable with a TSH of 0.64 for the patient that takes not thyroid supplementation.  This lab work can be repeated in a year.      Total cholesterol was 73 with an HDL of 29 LDL of 38 and triglycerides of 31.  Patient is compliant and tolerating his atorvastatin half a tablet of the 80 mg every night.  This is to continue as is.  Lab work can be repeated in a year.      Regarding impaired glucose tolerance hemoglobin A1c returned at 5.8% giving him an average glucose calculator over the last 60 days of 120.  This is not a significant elevation but on the upper limits of normal.  Diet and therapeutic lifestyle changes should suffice and the lab work can be repeated in 6 months.  There is family history of type 2 diabetes.  This lab was specifically be performed this facility July 11, 2023 with a follow-up appointment.      White blood cell count was unremarkable at 5900 with a hemoglobin of 15.6 hematocrit of 46 MCV of 92 and platelet count of [id number].      Hepatitis-C antibody testing was nonreactive.    Fasting glucose of 108 correlates with a hemoglobin A1c of 5.8.  BUN of 16 creatinine 1.0 gives him a normal calculated glomerular filtration rate over 60 cc/minute.  Calcium of 9.7 with the albumin of 4.1.  Sodium 136 potassium 4.3 chloride 104 bicarb of 25.  Liver function testing AST was 33 with an ALT of 63 total bilirubin of 0.5 alkaline phosphatase of 45.  With regards to the slight increase in the ALT this is not considered to be clinically significant and definitely does not preclude the patient from continue to benefit from the  atorvastatin 40 mg.  Lab work is to be repeated July 11, 2023 to get with a hemoglobin A1c for the sake of completeness.    Regarding the care gaps patient is already on statin with appropriate LDL level less than 40 in the form of atorvastatin 80 mg half a tablet every day.  Tdap vaccination will be administered here today.  HIV screening is not warranted.  Patient prefers no influenza vaccination and had 2 doses of the Moderna COVID vaccination.          Lab Visit on 01/24/2023   Component Date Value Ref Range Status    WBC 01/24/2023 5.96  3.90 - 12.70 K/uL Final    RBC 01/24/2023 5.03  4.60 - 6.20 M/uL Final    Hemoglobin 01/24/2023 15.6  14.0 - 18.0 g/dL Final    Hematocrit 01/24/2023 46.1  40.0 - 54.0 % Final    MCV 01/24/2023 92  82 - 98 fL Final    MCH 01/24/2023 31.0  27.0 - 31.0 pg Final    MCHC 01/24/2023 33.8  32.0 - 36.0 g/dL Final    RDW 01/24/2023 12.2  11.5 - 14.5 % Final    Platelets 01/24/2023 290  150 - 450 K/uL Final    MPV 01/24/2023 9.9  9.2 - 12.9 fL Final    Immature Granulocytes 01/24/2023 0.2  0.0 - 0.5 % Final    Gran # (ANC) 01/24/2023 2.1  1.8 - 7.7 K/uL Final    Immature Grans (Abs) 01/24/2023 0.01  0.00 - 0.04 K/uL Final    Lymph # 01/24/2023 2.9  1.0 - 4.8 K/uL Final    Mono # 01/24/2023 0.7  0.3 - 1.0 K/uL Final    Eos # 01/24/2023 0.1  0.0 - 0.5 K/uL Final    Baso # 01/24/2023 0.07  0.00 - 0.20 K/uL Final    nRBC 01/24/2023 0  0 /100 WBC Final    Gran % 01/24/2023 35.0 (L)  38.0 - 73.0 % Final    Lymph % 01/24/2023 49.2 (H)  18.0 - 48.0 % Final    Mono % 01/24/2023 12.1  4.0 - 15.0 % Final    Eosinophil % 01/24/2023 2.3  0.0 - 8.0 % Final    Basophil % 01/24/2023 1.2  0.0 - 1.9 % Final    Differential Method 01/24/2023 Automated   Final    Sodium 01/24/2023 136  136 - 145 mmol/L Final    Potassium 01/24/2023 4.3  3.5 - 5.1 mmol/L Final    Chloride 01/24/2023 104  95 - 110 mmol/L Final    CO2 01/24/2023 25  23 - 29 mmol/L Final    Glucose 01/24/2023 108  70 - 110 mg/dL Final    BUN  01/24/2023 16  6 - 20 mg/dL Final    Creatinine 01/24/2023 1.0  0.5 - 1.4 mg/dL Final    Calcium 01/24/2023 9.7  8.7 - 10.5 mg/dL Final    Total Protein 01/24/2023 6.8  6.0 - 8.4 g/dL Final    Albumin 01/24/2023 4.1  3.5 - 5.2 g/dL Final    Total Bilirubin 01/24/2023 0.5  0.1 - 1.0 mg/dL Final    Alkaline Phosphatase 01/24/2023 45 (L)  55 - 135 U/L Final    AST 01/24/2023 33  10 - 40 U/L Final    ALT 01/24/2023 63 (H)  10 - 44 U/L Final    Anion Gap 01/24/2023 7 (L)  8 - 16 mmol/L Final    eGFR 01/24/2023 >60  >60 mL/min/1.73 m^2 Final    TSH 01/24/2023 0.636  0.400 - 4.000 uIU/mL Final    Cholesterol 01/24/2023 73 (L)  120 - 199 mg/dL Final    Triglycerides 01/24/2023 31  30 - 150 mg/dL Final    HDL 01/24/2023 29 (L)  40 - 75 mg/dL Final    LDL Cholesterol 01/24/2023 37.8 (L)  63.0 - 159.0 mg/dL Final    HDL/Cholesterol Ratio 01/24/2023 39.7  20.0 - 50.0 % Final    Total Cholesterol/HDL Ratio 01/24/2023 2.5  2.0 - 5.0 Final    Non-HDL Cholesterol 01/24/2023 44  mg/dL Final    Hemoglobin A1C 01/24/2023 5.8 (H)  4.0 - 5.6 % Final    Estimated Avg Glucose 01/24/2023 120  68 - 131 mg/dL Final    Hepatitis C Ab 01/24/2023 Non-reactive  Non-reactive Final   Office Visit on 10/01/2022   Component Date Value Ref Range Status    SARS Coronavirus 2 Antigen 10/01/2022 Negative  Negative Final     Acceptable 10/01/2022 Yes   Final    Rapid Influenza A Ag 10/01/2022 Negative  Negative Final    Rapid Influenza B Ag 10/01/2022 Negative  Negative Final     Acceptable 10/01/2022 Yes   Final       Review of patient's allergies indicates:  No Known Allergies  Current Outpatient Medications on File Prior to Visit   Medication Sig Dispense Refill    clopidogreL (PLAVIX) 75 mg tablet Take 75 mg by mouth once daily.      esomeprazole (NEXIUM) 40 MG capsule Take 40 mg by mouth before breakfast.      metoprolol tartrate (LOPRESSOR) 25 MG tablet TAKE 0.5 TABLETS (12.5 MG TOTAL) BY MOUTH 2 (TWO) TIMES DAILY. 90  tablet 0    paroxetine (PAXIL) 10 MG tablet Take 1 tablet (10 mg total) by mouth every morning. 30 tablet 11    aspirin (ECOTRIN) 81 MG EC tablet Take 1 tablet (81 mg total) by mouth once daily. 30 tablet 0    atorvastatin (LIPITOR) 80 MG tablet Take 1 tablet (80 mg total) by mouth once daily. (Patient taking differently: Take 80 mg by mouth once daily. PT states taking a half of the 80mg) 30 tablet 0    enalapril (VASOTEC) 20 MG tablet Take 1 tablet (20 mg total) by mouth once daily. 30 tablet 0     No current facility-administered medications on file prior to visit.     Past Medical History:   Diagnosis Date    Coronary artery disease     GERD (gastroesophageal reflux disease)     Helicobacter pylori (H. pylori)     Hypertension     ST elevation (STEMI) myocardial infarction of unspecified site      Past Surgical History:   Procedure Laterality Date    ANGIOGRAM, CORONARY, WITH LEFT HEART CATHETERIZATION N/A 2020    Procedure: Angiogram, Coronary, with Left Heart Cath;  Surgeon: Félix Shaikh MD;  Location: [facility] CATH/EP LAB;  Service: Cardiology;  Laterality: N/A;    CORONARY ANGIOPLASTY WITH STENT PLACEMENT      HERNIA REPAIR       Family History   Problem Relation Age of Onset    Hypertension Mother     Diabetes Father     Hypertension Father      Social History     Tobacco Use    Smoking status: Former     Packs/day: 1.00     Types: Cigarettes     Quit date: 2021     Years since quittin.0    Smokeless tobacco: Never   Substance Use Topics    Alcohol use: Not Currently     Comment: Alcohol abuse, stopped since December 15, 2019    Drug use: Not Currently      Health Maintenance Topics with due status: Not Due       Topic Last Completion Date    Hemoglobin A1c (Prediabetes) 2023    Lipid Panel 2023    Aspirin/Antiplatelet Therapy 2023       There is no immunization history on file for this patient.    Review of Systems   OBJECTIVE:      Vitals:    23 1021   BP:  "124/78   BP Location: Left arm   Patient Position: Sitting   BP Method: Medium (Manual)   Pulse: 82   Resp: 16   SpO2: 97%   Weight: 94.8 kg (209 lb)   Height: 5' 8" (1.727 m)     Physical Exam   Assessment:       1. Routine general medical examination at a health care facility    2. Impaired glucose tolerance    3. Transaminitis    4. Need for Tdap vaccination    5. CY deficiency        Plan:       Routine general medical examination at a health care facility    Impaired glucose tolerance    Transaminitis    Need for Tdap vaccination    CY deficiency      Based on this wellness examination patient does not have any pressing health care issues that warrant any further testing.  For the sake of completeness his hemoglobin A1c and liver function tests are to be repeated at this facility 2023 with a follow-up appointment because of a hemoglobin A1c of 5.8 on and ALT over 50.  Patient is not to change medications as of now.      Diet and therapeutic lifestyle changes have been discussed.      Because of potential for him not be sensitive to the anti-platelet effect of the clopidogrel  driven by his genetics makeup, I have recommended for him to have the genetic testing with regards to the CYP3A activity.  A full purple top tube will be drawn here today and sent for analysis.  Patient will be informed of the report as soon as available.    Regarding his left breast mass he has already been seen by Dr. Stafford and has been scheduled to have the ultrasound this week      Counseled on age and gender appropriate medical preventative services, including cancer screenings, immunizations, overall nutritional health, need for a consistent exercise regimen and an overall push towards maintaining a vigorous and active lifestyle.      No follow-ups on file.              "

## 2023-02-10 LAB
ONEOME COMMENT: NORMAL
ONEOME METHOD: NORMAL

## 2023-03-07 ENCOUNTER — HOSPITAL ENCOUNTER (OUTPATIENT)
Dept: RADIOLOGY | Facility: HOSPITAL | Age: 38
Discharge: HOME | End: 2023-03-07
Attending: SURGERY
Payer: COMMERCIAL

## 2023-03-07 DIAGNOSIS — N63.41 SUBAREOLAR MASS OF RIGHT BREAST: ICD-10-CM

## 2023-03-07 DIAGNOSIS — N63.42 SUBAREOLAR MASS OF LEFT BREAST: ICD-10-CM

## 2023-03-07 DIAGNOSIS — N63.0 BREAST MASS IN MALE: ICD-10-CM

## 2023-03-07 PROCEDURE — 76642 ULTRASOUND BREAST LIMITED: CPT | Mod: TC,LT

## 2023-03-07 PROCEDURE — 77062 MAMMO DIGITAL DIAGNOSTIC BILAT WITH TOMO: ICD-10-PCS | Mod: 26,,, | Performed by: RADIOLOGY

## 2023-03-07 PROCEDURE — 76642 US BREAST LEFT LIMITED: ICD-10-PCS | Mod: 26,LT,, | Performed by: RADIOLOGY

## 2023-03-07 PROCEDURE — 77066 DX MAMMO INCL CAD BI: CPT | Mod: TC

## 2023-03-07 PROCEDURE — 76642 ULTRASOUND BREAST LIMITED: CPT | Mod: 26,LT,, | Performed by: RADIOLOGY

## 2023-03-07 PROCEDURE — 77066 DX MAMMO INCL CAD BI: CPT | Mod: 26,,, | Performed by: RADIOLOGY

## 2023-03-07 PROCEDURE — 77066 MAMMO DIGITAL DIAGNOSTIC BILAT WITH TOMO: ICD-10-PCS | Mod: 26,,, | Performed by: RADIOLOGY

## 2023-03-07 PROCEDURE — 77062 BREAST TOMOSYNTHESIS BI: CPT | Mod: 26,,, | Performed by: RADIOLOGY

## 2023-03-10 ENCOUNTER — PATIENT MESSAGE (OUTPATIENT)
Dept: SURGERY | Facility: CLINIC | Age: 38
End: 2023-03-10
Payer: COMMERCIAL

## 2023-05-03 ENCOUNTER — TELEPHONE (OUTPATIENT)
Dept: FAMILY MEDICINE | Facility: CLINIC | Age: 38
End: 2023-05-03
Payer: COMMERCIAL

## 2023-05-03 NOTE — TELEPHONE ENCOUNTER
Patient called to request that his lab work be faxed to his cardiologist office at [phone] to addressed to attention nurse. Faxed lab results to patient's cardiologist per patient request. VILMA Jackson

## 2023-05-24 ENCOUNTER — OFFICE VISIT (OUTPATIENT)
Dept: URGENT CARE | Facility: CLINIC | Age: 38
End: 2023-05-24
Payer: COMMERCIAL

## 2023-05-24 VITALS
TEMPERATURE: 98 F | BODY MASS INDEX: 31.78 KG/M2 | OXYGEN SATURATION: 97 % | HEART RATE: 63 BPM | RESPIRATION RATE: 16 BRPM | SYSTOLIC BLOOD PRESSURE: 119 MMHG | DIASTOLIC BLOOD PRESSURE: 74 MMHG | WEIGHT: 209 LBS

## 2023-05-24 DIAGNOSIS — J06.9 UPPER RESPIRATORY TRACT INFECTION, UNSPECIFIED TYPE: Primary | ICD-10-CM

## 2023-05-24 DIAGNOSIS — R05.1 ACUTE COUGH: ICD-10-CM

## 2023-05-24 DIAGNOSIS — H65.03 NON-RECURRENT ACUTE SEROUS OTITIS MEDIA OF BOTH EARS: ICD-10-CM

## 2023-05-24 DIAGNOSIS — R06.2 WHEEZING: ICD-10-CM

## 2023-05-24 DIAGNOSIS — R51.9 ACUTE NONINTRACTABLE HEADACHE, UNSPECIFIED HEADACHE TYPE: ICD-10-CM

## 2023-05-24 DIAGNOSIS — J03.90 TONSILLITIS: ICD-10-CM

## 2023-05-24 PROBLEM — H93.13 TINNITUS OF BOTH EARS: Status: ACTIVE | Noted: 2022-11-25

## 2023-05-24 PROBLEM — H81.4 VERTIGO OF CENTRAL ORIGIN: Status: ACTIVE | Noted: 2022-05-18

## 2023-05-24 PROBLEM — R42 DISEQUILIBRIUM: Status: ACTIVE | Noted: 2022-11-25

## 2023-05-24 PROCEDURE — 99214 OFFICE O/P EST MOD 30 MIN: CPT | Mod: 25,S$GLB,ICN, | Performed by: NURSE PRACTITIONER

## 2023-05-24 PROCEDURE — 96372 PR INJECTION,THERAP/PROPH/DIAG2ST, IM OR SUBCUT: ICD-10-PCS | Mod: S$GLB,ICN,, | Performed by: NURSE PRACTITIONER

## 2023-05-24 PROCEDURE — 96372 THER/PROPH/DIAG INJ SC/IM: CPT | Mod: S$GLB,ICN,, | Performed by: NURSE PRACTITIONER

## 2023-05-24 PROCEDURE — 99214 PR OFFICE/OUTPT VISIT, EST, LEVL IV, 30-39 MIN: ICD-10-PCS | Mod: 25,S$GLB,ICN, | Performed by: NURSE PRACTITIONER

## 2023-05-24 RX ORDER — LEVOCETIRIZINE DIHYDROCHLORIDE 5 MG/1
5 TABLET, FILM COATED ORAL NIGHTLY
Qty: 21 TABLET | Refills: 0 | Status: SHIPPED | OUTPATIENT
Start: 2023-05-24 | End: 2023-06-14

## 2023-05-24 RX ORDER — GUAIFENESIN AND DEXTROMETHORPHAN HYDROBROMIDE 1200; 60 MG/1; MG/1
1 TABLET, EXTENDED RELEASE ORAL EVERY 12 HOURS PRN
Qty: 20 TABLET | Refills: 0 | Status: SHIPPED | OUTPATIENT
Start: 2023-05-24 | End: 2023-06-03

## 2023-05-24 RX ORDER — DEXAMETHASONE SODIUM PHOSPHATE 4 MG/ML
8 INJECTION, SOLUTION INTRA-ARTICULAR; INTRALESIONAL; INTRAMUSCULAR; INTRAVENOUS; SOFT TISSUE ONCE
Status: COMPLETED | OUTPATIENT
Start: 2023-05-24 | End: 2023-05-24

## 2023-05-24 RX ORDER — CEFTRIAXONE 1 G/1
1 INJECTION, POWDER, FOR SOLUTION INTRAMUSCULAR; INTRAVENOUS
Status: COMPLETED | OUTPATIENT
Start: 2023-05-24 | End: 2023-05-24

## 2023-05-24 RX ORDER — CEFDINIR 300 MG/1
300 CAPSULE ORAL 2 TIMES DAILY
Qty: 20 CAPSULE | Refills: 0 | Status: SHIPPED | OUTPATIENT
Start: 2023-05-24 | End: 2023-06-03

## 2023-05-24 RX ADMIN — DEXAMETHASONE SODIUM PHOSPHATE 8 MG: 4 INJECTION, SOLUTION INTRA-ARTICULAR; INTRALESIONAL; INTRAMUSCULAR; INTRAVENOUS; SOFT TISSUE at 05:05

## 2023-05-24 RX ADMIN — CEFTRIAXONE 1 G: 1 INJECTION, POWDER, FOR SOLUTION INTRAMUSCULAR; INTRAVENOUS at 05:05

## 2023-05-24 NOTE — PROGRESS NOTES
Subjective:      Patient ID: Trace Bird III is a 37 y.o. male.    Vitals:  weight is 94.8 kg (209 lb). His oral temperature is 98.3 °F (36.8 °C). His blood pressure is 119/74 and his pulse is 63. His respiration is 16 and oxygen saturation is 97%.     Chief Complaint: Cough    Trace Bird III presents to clinic with cough, wheezing and headache for 3 days.  Patient reports that whenever he coughs he feels like he has an intensified headache and experiences a large amount of pressure inside of his head in his concerned about whether or not he may potentially have an aneurysm.    Cough  This is a new problem. The current episode started in the past 7 days. The problem has been gradually worsening. Associated symptoms include headaches and wheezing.     Constitution: Negative.   HENT: Negative.     Neck: neck negative.   Cardiovascular: Negative.    Eyes: Negative.    Respiratory:  Positive for cough and wheezing.    Gastrointestinal: Negative.    Endocrine: negative.   Genitourinary: Negative.    Musculoskeletal: Negative.    Skin: Negative.    Neurological:  Positive for headaches.    Objective:     Physical Exam   Constitutional: He is oriented to person, place, and time. He appears well-developed. He is cooperative.  Non-toxic appearance. He does not appear ill. No distress.   HENT:   Head: Normocephalic and atraumatic.   Ears:   Right Ear: Hearing, external ear and ear canal normal. Tympanic membrane is injected. A middle ear effusion is present.   Left Ear: Hearing, external ear and ear canal normal. Tympanic membrane is injected. A middle ear effusion is present.   Nose: Nose normal. No mucosal edema, rhinorrhea or nasal deformity. No epistaxis. Right sinus exhibits no maxillary sinus tenderness and no frontal sinus tenderness. Left sinus exhibits no maxillary sinus tenderness and no frontal sinus tenderness.   Mouth/Throat: Uvula is midline and mucous membranes are normal. No trismus in the jaw. Normal  dentition. No uvula swelling. Posterior oropharyngeal erythema present. No oropharyngeal exudate or posterior oropharyngeal edema. Tonsils are 3+ on the right. Tonsils are 3+ on the left.   Eyes: Conjunctivae and lids are normal. No scleral icterus.   Neck: Trachea normal and phonation normal. Neck supple. No edema present. No erythema present. No neck rigidity present.   Cardiovascular: Normal rate, regular rhythm, normal heart sounds and normal pulses.   Pulmonary/Chest: Effort normal. No respiratory distress. He has no decreased breath sounds. He has wheezes in the right upper field, the right middle field and the left upper field. He has rhonchi in the right upper field, the right middle field and the left upper field.   Abdominal: Normal appearance.   Musculoskeletal: Normal range of motion.         General: No deformity. Normal range of motion.   Lymphadenopathy:     He has cervical adenopathy.        Right cervical: Superficial cervical adenopathy present.        Left cervical: Superficial cervical adenopathy present.   Neurological: He is alert and oriented to person, place, and time. He exhibits normal muscle tone. Coordination normal.   Skin: Skin is warm, dry, intact, not diaphoretic and not pale.   Psychiatric: His speech is normal and behavior is normal. Judgment and thought content normal.   Nursing note and vitals reviewed.    Assessment:     1. Upper respiratory tract infection, unspecified type    2. Non-recurrent acute serous otitis media of both ears    3. Tonsillitis    4. Acute cough    5. Wheezing    6. Acute nonintractable headache, unspecified headache type        Plan:       Upper respiratory tract infection, unspecified type  -     cefdinir (OMNICEF) 300 MG capsule; Take 1 capsule (300 mg total) by mouth 2 (two) times daily. for 10 days  Dispense: 20 capsule; Refill: 0  -     cefTRIAXone injection 1 g  -     dexAMETHasone injection 8 mg  -     dextromethorphan-guaiFENesin (MUCINEX DM)  60-1,200 mg per 12 hr tablet; Take 1 tablet by mouth every 12 (twelve) hours as needed.  Dispense: 20 tablet; Refill: 0  -     levocetirizine (XYZAL) 5 MG tablet; Take 1 tablet (5 mg total) by mouth every evening. for 21 days  Dispense: 21 tablet; Refill: 0    Non-recurrent acute serous otitis media of both ears  -     cefdinir (OMNICEF) 300 MG capsule; Take 1 capsule (300 mg total) by mouth 2 (two) times daily. for 10 days  Dispense: 20 capsule; Refill: 0  -     cefTRIAXone injection 1 g  -     dexAMETHasone injection 8 mg  -     dextromethorphan-guaiFENesin (MUCINEX DM) 60-1,200 mg per 12 hr tablet; Take 1 tablet by mouth every 12 (twelve) hours as needed.  Dispense: 20 tablet; Refill: 0  -     levocetirizine (XYZAL) 5 MG tablet; Take 1 tablet (5 mg total) by mouth every evening. for 21 days  Dispense: 21 tablet; Refill: 0    Tonsillitis  -     cefdinir (OMNICEF) 300 MG capsule; Take 1 capsule (300 mg total) by mouth 2 (two) times daily. for 10 days  Dispense: 20 capsule; Refill: 0  -     cefTRIAXone injection 1 g  -     dexAMETHasone injection 8 mg  -     dextromethorphan-guaiFENesin (MUCINEX DM) 60-1,200 mg per 12 hr tablet; Take 1 tablet by mouth every 12 (twelve) hours as needed.  Dispense: 20 tablet; Refill: 0  -     levocetirizine (XYZAL) 5 MG tablet; Take 1 tablet (5 mg total) by mouth every evening. for 21 days  Dispense: 21 tablet; Refill: 0    Acute cough  -     cefdinir (OMNICEF) 300 MG capsule; Take 1 capsule (300 mg total) by mouth 2 (two) times daily. for 10 days  Dispense: 20 capsule; Refill: 0  -     cefTRIAXone injection 1 g  -     dexAMETHasone injection 8 mg  -     dextromethorphan-guaiFENesin (MUCINEX DM) 60-1,200 mg per 12 hr tablet; Take 1 tablet by mouth every 12 (twelve) hours as needed.  Dispense: 20 tablet; Refill: 0  -     levocetirizine (XYZAL) 5 MG tablet; Take 1 tablet (5 mg total) by mouth every evening. for 21 days  Dispense: 21 tablet; Refill: 0    Wheezing  -     cefdinir  (OMNICEF) 300 MG capsule; Take 1 capsule (300 mg total) by mouth 2 (two) times daily. for 10 days  Dispense: 20 capsule; Refill: 0  -     cefTRIAXone injection 1 g  -     dexAMETHasone injection 8 mg  -     dextromethorphan-guaiFENesin (MUCINEX DM) 60-1,200 mg per 12 hr tablet; Take 1 tablet by mouth every 12 (twelve) hours as needed.  Dispense: 20 tablet; Refill: 0  -     levocetirizine (XYZAL) 5 MG tablet; Take 1 tablet (5 mg total) by mouth every evening. for 21 days  Dispense: 21 tablet; Refill: 0    Acute nonintractable headache, unspecified headache type  -     cefdinir (OMNICEF) 300 MG capsule; Take 1 capsule (300 mg total) by mouth 2 (two) times daily. for 10 days  Dispense: 20 capsule; Refill: 0  -     cefTRIAXone injection 1 g  -     dexAMETHasone injection 8 mg  -     dextromethorphan-guaiFENesin (MUCINEX DM) 60-1,200 mg per 12 hr tablet; Take 1 tablet by mouth every 12 (twelve) hours as needed.  Dispense: 20 tablet; Refill: 0  -     levocetirizine (XYZAL) 5 MG tablet; Take 1 tablet (5 mg total) by mouth every evening. for 21 days  Dispense: 21 tablet; Refill: 0      Patient will rotate Tylenol and Motrin for had a number of years albuterol inhaler for wheezing.

## 2023-05-31 NOTE — ASSESSMENT & PLAN NOTE
Last seen by Dr. Stacy Diaz 07/14/2022. Pump settings:   Insulin Pump Settings  Basal rate:  Midnight: 1 u/hr  Carb ratio: Midnight: 4.8 grams, 1100: 4.4 grams, 1600: 4.8 grams  Insulin Sensitivity Factor:  35  Target:  110  Active insulin time 5 hours.     07/14/2022  A1c 7.5%    03/14/2022  A1c 8.1%    02/15/2022  DST Cortisol 0.6 (NL)  TSH 1.937    11/15/2021  A1c 8.2%    10/02/2021  Creatinine 0.67   Glucose 112  TSH 3.704  Urine microalbumin/creatinine <2  Triglycerides 77  HDL 57  LDL 90 Patient reports prior history of binge drinking, denies dependency.  Last drink consumed December 2019.

## 2023-06-20 ENCOUNTER — OFFICE VISIT (OUTPATIENT)
Dept: FAMILY MEDICINE | Facility: CLINIC | Age: 38
End: 2023-06-20
Payer: COMMERCIAL

## 2023-06-20 VITALS
HEART RATE: 62 BPM | HEIGHT: 68 IN | RESPIRATION RATE: 16 BRPM | BODY MASS INDEX: 31.83 KG/M2 | WEIGHT: 210 LBS | SYSTOLIC BLOOD PRESSURE: 116 MMHG | TEMPERATURE: 99 F | DIASTOLIC BLOOD PRESSURE: 72 MMHG | OXYGEN SATURATION: 98 %

## 2023-06-20 DIAGNOSIS — Z95.5 S/P CORONARY ARTERY STENT PLACEMENT: ICD-10-CM

## 2023-06-20 DIAGNOSIS — J03.90 TONSILLITIS: Primary | ICD-10-CM

## 2023-06-20 DIAGNOSIS — E88.09 CYP3A4 DEFICIENCY: ICD-10-CM

## 2023-06-20 DIAGNOSIS — Z95.5 HX OF PLACEMENT OF STENT IN ANTERIOR DESCENDING BRANCH OF LEFT CORONARY ARTERY: ICD-10-CM

## 2023-06-20 RX ORDER — DEXAMETHASONE SODIUM PHOSPHATE 4 MG/ML
4 INJECTION, SOLUTION INTRA-ARTICULAR; INTRALESIONAL; INTRAMUSCULAR; INTRAVENOUS; SOFT TISSUE
Status: COMPLETED | OUTPATIENT
Start: 2023-06-20 | End: 2023-06-20

## 2023-06-20 RX ORDER — CEFUROXIME AXETIL 500 MG/1
500 TABLET ORAL EVERY 12 HOURS
Qty: 14 TABLET | Refills: 0 | Status: SHIPPED | OUTPATIENT
Start: 2023-06-20 | End: 2023-07-12

## 2023-06-20 RX ADMIN — DEXAMETHASONE SODIUM PHOSPHATE 4 MG: 4 INJECTION, SOLUTION INTRA-ARTICULAR; INTRALESIONAL; INTRAMUSCULAR; INTRAVENOUS; SOFT TISSUE at 01:06

## 2023-06-20 NOTE — PROGRESS NOTES
Subjective     Patient ID: Trace Bird III is a 37 y.o. male.    Chief Complaint: Follow-up (Patient reports sore throat, dry cough, and fever.)    Presents with complaint of severe sore throat dry cough and fever.  He said that he was having an elevated temperature even though he was taking Tylenol for his discomfort.  The few was mainly present in the mornings.  At least for the last 3 days.  He has actually been at a local urgent care.  He was told that he had a sinus infection.  That was over 3 weeks ago.    It hurts to swallow.  No hemoptysis or wheezing shortness of breath or a productive cough.  Patient is in no distress.  He is not talking like he has an upper airway obstruction.    On presentation he said that he felt like he actually had kissing tonsils and he actually used that term.  By my examination he was not too far from the truth.        Review of Systems   All other systems reviewed and are negative.       Objective     Physical Exam  Vitals and nursing note reviewed.   Constitutional:       General: He is not in acute distress.     Appearance: Normal appearance. He is obese. He is not ill-appearing, toxic-appearing or diaphoretic.   HENT:      Head: Normocephalic and atraumatic.      Mouth/Throat:      Mouth: Mucous membranes are moist.      Pharynx: Oropharyngeal exudate and posterior oropharyngeal erythema present.      Comments: Marked tonsillar hypertrophy with very small approximately quarter of an inch paste left between both of them with also involvement of the uvula as it pertains to the erythema on the swelling.  There appears to be an exudate present more on the right tonsil.  No halitosis was noted.  This exudate is not white and hairy looking.  Eyes:      General: No scleral icterus.        Right eye: No discharge.         Left eye: No discharge.      Extraocular Movements: Extraocular movements intact.      Conjunctiva/sclera: Conjunctivae normal.      Pupils: Pupils are equal,  round, and reactive to light.   Cardiovascular:      Rate and Rhythm: Normal rate.      Pulses: Normal pulses.   Musculoskeletal:         General: No swelling or tenderness. Normal range of motion.   Skin:     General: Skin is warm and dry.      Coloration: Skin is not jaundiced or pale.   Neurological:      General: No focal deficit present.      Mental Status: He is alert and oriented to person, place, and time. Mental status is at baseline.      Cranial Nerves: No cranial nerve deficit.      Sensory: No sensory deficit.      Motor: No weakness.      Coordination: Coordination normal.      Gait: Gait normal.   Psychiatric:         Mood and Affect: Mood normal.         Behavior: Behavior normal.         Thought Content: Thought content normal.         Judgment: Judgment normal.          Assessment and Plan     1. Tonsillitis    2. CY deficiency    3. S/P coronary artery stent placement    4. Hx of placement of stent in anterior descending branch of left coronary artery      Patient is definitely suffering from an acute tonsillitis that will be treated empirically with cefuroxime axetil 500 mg 1 by mouth twice a day for total of 14 doses.  Contact precautions have been discussed specially since he has small children.  He can use liquid Benadryl to gargle to sooth his throat and Tylenol for the control of the fever and pain.  I have discussed with him the importance of watching for potentially developing jaundice, left upper quadrant abdominal pain or abdominal pain, difficulty with swallowing or changes in the voice and he is to seek attention if any of these appear.  There is no use in testing for infectious mononucleosis at this time.    Patient remained stable as it pertains to his history of coronary artery disease.    Medication list was reviewed and there is no need for any other refills.         No follow-ups on file.

## 2023-07-11 ENCOUNTER — LAB VISIT (OUTPATIENT)
Dept: FAMILY MEDICINE | Facility: CLINIC | Age: 38
End: 2023-07-11
Payer: COMMERCIAL

## 2023-07-11 DIAGNOSIS — R74.01 TRANSAMINITIS: ICD-10-CM

## 2023-07-11 DIAGNOSIS — R73.02 IMPAIRED GLUCOSE TOLERANCE: ICD-10-CM

## 2023-07-11 LAB
ALBUMIN SERPL BCP-MCNC: 4 G/DL (ref 3.5–5.2)
ALP SERPL-CCNC: 51 U/L (ref 55–135)
ALT SERPL W/O P-5'-P-CCNC: 66 U/L (ref 10–44)
AST SERPL-CCNC: 35 U/L (ref 10–40)
BILIRUB DIRECT SERPL-MCNC: 0.3 MG/DL (ref 0.1–0.3)
BILIRUB SERPL-MCNC: 0.4 MG/DL (ref 0.1–1)
ESTIMATED AVG GLUCOSE: 120 MG/DL (ref 68–131)
HBA1C MFR BLD: 5.8 % (ref 4–5.6)
PROT SERPL-MCNC: 6.7 G/DL (ref 6–8.4)

## 2023-07-11 PROCEDURE — 80076 HEPATIC FUNCTION PANEL: CPT | Performed by: INTERNAL MEDICINE

## 2023-07-11 PROCEDURE — 83036 HEMOGLOBIN GLYCOSYLATED A1C: CPT | Performed by: INTERNAL MEDICINE

## 2023-07-12 ENCOUNTER — OFFICE VISIT (OUTPATIENT)
Dept: FAMILY MEDICINE | Facility: CLINIC | Age: 38
End: 2023-07-12
Payer: COMMERCIAL

## 2023-07-12 VITALS
RESPIRATION RATE: 16 BRPM | DIASTOLIC BLOOD PRESSURE: 66 MMHG | OXYGEN SATURATION: 98 % | BODY MASS INDEX: 32.41 KG/M2 | HEART RATE: 72 BPM | HEIGHT: 68 IN | SYSTOLIC BLOOD PRESSURE: 124 MMHG | WEIGHT: 213.88 LBS

## 2023-07-12 DIAGNOSIS — R74.01 TRANSAMINITIS: ICD-10-CM

## 2023-07-12 DIAGNOSIS — Z95.5 S/P CORONARY ARTERY STENT PLACEMENT: ICD-10-CM

## 2023-07-12 DIAGNOSIS — R73.02 IMPAIRED GLUCOSE TOLERANCE: Primary | ICD-10-CM

## 2023-07-12 DIAGNOSIS — F41.1 GAD (GENERALIZED ANXIETY DISORDER): ICD-10-CM

## 2023-07-12 PROCEDURE — 1159F PR MEDICATION LIST DOCUMENTED IN MEDICAL RECORD: ICD-10-PCS | Mod: S$GLB,,, | Performed by: INTERNAL MEDICINE

## 2023-07-12 PROCEDURE — 3044F HG A1C LEVEL LT 7.0%: CPT | Mod: S$GLB,,, | Performed by: INTERNAL MEDICINE

## 2023-07-12 PROCEDURE — 4010F PR ACE/ARB THEARPY RXD/TAKEN: ICD-10-PCS | Mod: S$GLB,,, | Performed by: INTERNAL MEDICINE

## 2023-07-12 PROCEDURE — 3074F PR MOST RECENT SYSTOLIC BLOOD PRESSURE < 130 MM HG: ICD-10-PCS | Mod: S$GLB,,, | Performed by: INTERNAL MEDICINE

## 2023-07-12 PROCEDURE — 99213 OFFICE O/P EST LOW 20 MIN: CPT | Mod: S$GLB,,, | Performed by: INTERNAL MEDICINE

## 2023-07-12 PROCEDURE — 1159F MED LIST DOCD IN RCRD: CPT | Mod: S$GLB,,, | Performed by: INTERNAL MEDICINE

## 2023-07-12 PROCEDURE — 99213 PR OFFICE/OUTPT VISIT, EST, LEVL III, 20-29 MIN: ICD-10-PCS | Mod: S$GLB,,, | Performed by: INTERNAL MEDICINE

## 2023-07-12 PROCEDURE — 3078F PR MOST RECENT DIASTOLIC BLOOD PRESSURE < 80 MM HG: ICD-10-PCS | Mod: S$GLB,,, | Performed by: INTERNAL MEDICINE

## 2023-07-12 PROCEDURE — 3008F PR BODY MASS INDEX (BMI) DOCUMENTED: ICD-10-PCS | Mod: S$GLB,,, | Performed by: INTERNAL MEDICINE

## 2023-07-12 PROCEDURE — 3074F SYST BP LT 130 MM HG: CPT | Mod: S$GLB,,, | Performed by: INTERNAL MEDICINE

## 2023-07-12 PROCEDURE — 3008F BODY MASS INDEX DOCD: CPT | Mod: S$GLB,,, | Performed by: INTERNAL MEDICINE

## 2023-07-12 PROCEDURE — 3078F DIAST BP <80 MM HG: CPT | Mod: S$GLB,,, | Performed by: INTERNAL MEDICINE

## 2023-07-12 PROCEDURE — 4010F ACE/ARB THERAPY RXD/TAKEN: CPT | Mod: S$GLB,,, | Performed by: INTERNAL MEDICINE

## 2023-07-12 PROCEDURE — 3044F PR MOST RECENT HEMOGLOBIN A1C LEVEL <7.0%: ICD-10-PCS | Mod: S$GLB,,, | Performed by: INTERNAL MEDICINE

## 2023-07-12 RX ORDER — FLUOXETINE 10 MG/1
CAPSULE ORAL
Qty: 60 CAPSULE | Refills: 11 | Status: SHIPPED | OUTPATIENT
Start: 2023-07-12

## 2023-07-12 NOTE — PROGRESS NOTES
Subjective     Patient ID: Trace Bird III is a 37 y.o. male.    Chief Complaint: Follow-up (Follow up hepatic function panel and A1C)    Patient presents for follow-up of his transaminitis on impaired glucose tolerance.      Laboratory evaluation carried out at this facility on July 11, 2023 and is AST and ALT were 35 and 66 respectively.  This is not a significant change from prior and there is no detrimental trend.  Plans are for the liver function testing to be repeated at this facility January of 2024.      Regarding his impaired glucose tolerance there is no change in his hemoglobin A1c of 5.8% without any medication intervention at this time.  Besides diet and therapeutic lifestyle changes no other plans are made today.  Hemoglobin A1c is to be repeated together with the transaminases January of 2024 with a follow-up appointment.      Patient had just had an echocardiogram and stress test performed under the direction of his cardiologist and will be following up with him next week.      He is still having issues with regards to generalized anxiety disorder and having difficulty with sleep does better with the use of the Paxil at half of a 10 mg tablet because when he was taking a full-dose he was having issues with libido.  Plans are for him to be switched over to the Prozac 10 mg capsule 1 by mouth every night to see if by changing chemicals during the same class he actually has benefits without the side effects.  Patient is also aware of the fact that if we are able to titrate him up to the 20 mg a day of the fluoxetine he actually can then take 80 mg 1 time a week with same efficacy and safety.    He is not having any angina like symptomatology.      He remains on full dose atorvastatin at 80 mg every night, enalapril 20 mg 1 by mouth every day, aspirin 81 mg 1 a day, metoprolol tartrate 25 mg tablets half a tablet by mouth 2 times a day.  No prescription for these medications are issued today.  Patient  can call whenever he needs them refilled.          Review of Systems   All other systems reviewed and are negative.       Objective     Physical Exam  Vitals and nursing note reviewed.   Constitutional:       General: He is not in acute distress.     Appearance: Normal appearance. He is obese. He is not ill-appearing, toxic-appearing or diaphoretic.   HENT:      Head: Normocephalic and atraumatic.   Cardiovascular:      Rate and Rhythm: Normal rate.      Pulses: Normal pulses.   Pulmonary:      Effort: Pulmonary effort is normal.   Musculoskeletal:      Right lower leg: No edema.      Left lower leg: No edema.   Skin:     General: Skin is warm and dry.      Coloration: Skin is not jaundiced or pale.   Neurological:      General: No focal deficit present.      Mental Status: He is alert and oriented to person, place, and time. Mental status is at baseline.      Cranial Nerves: No cranial nerve deficit.      Sensory: No sensory deficit.      Motor: No weakness.      Coordination: Coordination normal.      Gait: Gait normal.   Psychiatric:         Mood and Affect: Mood normal.         Behavior: Behavior normal.         Thought Content: Thought content normal.         Judgment: Judgment normal.          Assessment and Plan     1. Impaired glucose tolerance    2. PAOLA (generalized anxiety disorder)    3. S/P coronary artery stent placement    4. Transaminitis        With regards to his impaired glucose tolerance patient's hemoglobin A1c stable at 5.8% and plans are for him to have repeat hemoglobin A1c at this facility in 6 months as per the current protocol.      Diet and therapeutic lifestyle changes have been discussed again.      He is already had echocardiogram and stress test as per his cardiologist recommendation and is to follow-up with him next week.      Medication list has been reviewed and there is no need for any refills at this time.      Because of in efficacy of the Paxil at 5 mg by mouth every day patient  will be switched over to Prozac at 10 mg by mouth at bedtime.  A prescription for 30 day supply with 11 refills has been issued as a trial.  He is to call me with any concerns with regards to efficacy or tolerability.    Vital signs remained stable with a blood pressure 124/66.      Patient is not having any angina like symptomatology.      Transaminitis is stable.         No follow-ups on file.

## 2023-07-12 NOTE — Clinical Note
The chart is showing high dose statin as a care gaps when this patient has been on atorvastatin maximal dose which is 80 mg a day for years.  This needs to be corrected thank you

## 2023-07-19 DIAGNOSIS — E78.00 HYPERCHOLESTEROLEMIA: Primary | ICD-10-CM

## 2023-07-19 RX ORDER — ATORVASTATIN CALCIUM 40 MG/1
TABLET, FILM COATED ORAL
Qty: 90 TABLET | Refills: 3 | Status: SHIPPED | OUTPATIENT
Start: 2023-07-19

## 2024-01-19 ENCOUNTER — OFFICE VISIT (OUTPATIENT)
Dept: URGENT CARE | Facility: CLINIC | Age: 39
End: 2024-01-19
Payer: COMMERCIAL

## 2024-01-19 VITALS
BODY MASS INDEX: 33.34 KG/M2 | RESPIRATION RATE: 16 BRPM | DIASTOLIC BLOOD PRESSURE: 74 MMHG | HEIGHT: 68 IN | OXYGEN SATURATION: 96 % | TEMPERATURE: 98 F | SYSTOLIC BLOOD PRESSURE: 115 MMHG | WEIGHT: 220 LBS | HEART RATE: 88 BPM

## 2024-01-19 DIAGNOSIS — J02.9 SORE THROAT: Primary | ICD-10-CM

## 2024-01-19 DIAGNOSIS — R05.9 COUGH, UNSPECIFIED TYPE: ICD-10-CM

## 2024-01-19 DIAGNOSIS — J01.40 ACUTE NON-RECURRENT PANSINUSITIS: ICD-10-CM

## 2024-01-19 LAB
CTP QC/QA: YES
FLUAV AG NPH QL: NEGATIVE
FLUBV AG NPH QL: NEGATIVE
S PYO RRNA THROAT QL PROBE: NEGATIVE
SARS-COV-2 AG RESP QL IA.RAPID: NEGATIVE

## 2024-01-19 PROCEDURE — 87811 SARS-COV-2 COVID19 W/OPTIC: CPT | Mod: QW,S$GLB,, | Performed by: STUDENT IN AN ORGANIZED HEALTH CARE EDUCATION/TRAINING PROGRAM

## 2024-01-19 PROCEDURE — 99214 OFFICE O/P EST MOD 30 MIN: CPT | Mod: 25,S$GLB,, | Performed by: STUDENT IN AN ORGANIZED HEALTH CARE EDUCATION/TRAINING PROGRAM

## 2024-01-19 PROCEDURE — 96372 THER/PROPH/DIAG INJ SC/IM: CPT | Mod: S$GLB,,, | Performed by: STUDENT IN AN ORGANIZED HEALTH CARE EDUCATION/TRAINING PROGRAM

## 2024-01-19 PROCEDURE — 87880 STREP A ASSAY W/OPTIC: CPT | Mod: QW,,, | Performed by: STUDENT IN AN ORGANIZED HEALTH CARE EDUCATION/TRAINING PROGRAM

## 2024-01-19 PROCEDURE — 87804 INFLUENZA ASSAY W/OPTIC: CPT | Mod: 59,QW,, | Performed by: STUDENT IN AN ORGANIZED HEALTH CARE EDUCATION/TRAINING PROGRAM

## 2024-01-19 RX ORDER — FLUTICASONE PROPIONATE 50 MCG
2 SPRAY, SUSPENSION (ML) NASAL DAILY
Qty: 15.8 ML | Refills: 0 | Status: SHIPPED | OUTPATIENT
Start: 2024-01-19

## 2024-01-19 RX ORDER — AMOXICILLIN AND CLAVULANATE POTASSIUM 875; 125 MG/1; MG/1
1 TABLET, FILM COATED ORAL EVERY 12 HOURS
Qty: 20 TABLET | Refills: 0 | Status: SHIPPED | OUTPATIENT
Start: 2024-01-19 | End: 2024-01-29

## 2024-01-19 RX ORDER — DEXAMETHASONE SODIUM PHOSPHATE 100 MG/10ML
10 INJECTION INTRAMUSCULAR; INTRAVENOUS
Status: COMPLETED | OUTPATIENT
Start: 2024-01-19 | End: 2024-01-19

## 2024-01-19 RX ORDER — CHLOPHEDIANOL HCL AND PYRILAMINE MALEATE 12.5; 12.5 MG/5ML; MG/5ML
5 SOLUTION ORAL
Qty: 118 ML | Refills: 0 | Status: SHIPPED | OUTPATIENT
Start: 2024-01-19

## 2024-01-19 RX ADMIN — DEXAMETHASONE SODIUM PHOSPHATE 10 MG: 100 INJECTION INTRAMUSCULAR; INTRAVENOUS at 08:01

## 2024-01-19 NOTE — PROGRESS NOTES
"Subjective:      Patient ID: Trace Bird III is a 38 y.o. male.    Vitals:  height is 5' 8" (1.727 m) and weight is 99.8 kg (220 lb). His temperature is 98.3 °F (36.8 °C). His blood pressure is 115/74 and his pulse is 88. His respiration is 16 and oxygen saturation is 96%.     Chief Complaint: Sinus Problem    Patient is a 38-year-old male with a past medical history of hypertension, hyperlipidemia, coronary artery disease, and GERD who presents to clinic for evaluation of cough and congestion.  Patient reports he is not vaccinated.  Patient reports symptoms began yesterday.  Patient reports no recent or known sick exposures.  Patient reports no over-the-counter medications for symptoms at this point.    Sinus Problem  This is a new problem. The current episode started yesterday. The problem is unchanged. There has been no fever. He is experiencing no pain. Associated symptoms include congestion, coughing, headaches, sinus pressure and a sore throat. Pertinent negatives include no chills, diaphoresis, ear pain or shortness of breath.       Constitution: Negative. Negative for chills, sweating, fatigue and fever.   HENT:  Positive for congestion, postnasal drip, sinus pressure and sore throat. Negative for ear pain and trouble swallowing.    Neck: neck negative.   Cardiovascular: Negative.  Negative for chest pain and palpitations.   Eyes: Negative.    Respiratory:  Positive for cough. Negative for chest tightness, sputum production and shortness of breath.    Gastrointestinal: Negative.  Negative for abdominal pain, nausea, vomiting and diarrhea.   Endocrine: negative.   Genitourinary: Negative.    Musculoskeletal:  Positive for muscle ache.   Skin: Negative.  Negative for color change, pale, rash and erythema.   Allergic/Immunologic: Negative.    Neurological:  Positive for headaches. Negative for dizziness, light-headedness, passing out, disorientation and altered mental status.   Hematologic/Lymphatic: " Negative.    Psychiatric/Behavioral: Negative.  Negative for altered mental status, disorientation and confusion.       Objective:     Physical Exam   Constitutional: He is oriented to person, place, and time. He appears well-developed. He is cooperative.  Non-toxic appearance. He does not appear ill. No distress.   HENT:   Head: Normocephalic and atraumatic.   Ears:   Right Ear: Hearing, tympanic membrane, external ear and ear canal normal.   Left Ear: Hearing, tympanic membrane, external ear and ear canal normal.   Nose: Congestion present. No mucosal edema, rhinorrhea or nasal deformity. No epistaxis. Right sinus exhibits maxillary sinus tenderness and frontal sinus tenderness. Left sinus exhibits maxillary sinus tenderness and frontal sinus tenderness.   Mouth/Throat: Uvula is midline and mucous membranes are normal. Mucous membranes are moist. No trismus in the jaw. Normal dentition. No uvula swelling. Posterior oropharyngeal erythema present. No oropharyngeal exudate. Oropharynx is clear.   Eyes: Conjunctivae and lids are normal. Pupils are equal, round, and reactive to light. Right eye exhibits no discharge. Left eye exhibits no discharge. No scleral icterus.   Neck: Trachea normal and phonation normal. Neck supple. No neck rigidity present.   Cardiovascular: Normal rate, regular rhythm, normal heart sounds and normal pulses.   Pulmonary/Chest: Effort normal and breath sounds normal. No respiratory distress. He has no wheezes. He has no rhonchi. He has no rales.   Abdominal: Normal appearance and bowel sounds are normal. He exhibits no distension. Soft. There is no abdominal tenderness.   Musculoskeletal: Normal range of motion.         General: Normal range of motion.      Cervical back: He exhibits no tenderness.   Lymphadenopathy:     He has no cervical adenopathy.   Neurological: He is alert and oriented to person, place, and time. He exhibits normal muscle tone.   Skin: Skin is warm, dry, intact, not  diaphoretic, not pale and no rash. Capillary refill takes less than 2 seconds. No erythema   Psychiatric: His speech is normal and behavior is normal. Judgment and thought content normal.   Nursing note and vitals reviewed.      Assessment:     1. Sore throat    2. Cough, unspecified type    3. Acute non-recurrent pansinusitis        Plan:       Sore throat  -     POCT rapid strep A    Cough, unspecified type  -     SARS Coronavirus 2 Antigen, POCT Manual Read  -     POCT Influenza A/B Rapid Antigen    Acute non-recurrent pansinusitis    Other orders  -     dexAMETHasone injection 10 mg  -     amoxicillin-clavulanate 875-125mg (AUGMENTIN) 875-125 mg per tablet; Take 1 tablet by mouth every 12 (twelve) hours. for 10 days  Dispense: 20 tablet; Refill: 0  -     pyrilamine-chlophedianoL (NINJACOF) 12.5-12.5 mg/5 mL Liqd; Take 5 mLs by mouth every 6 to 8 hours as needed (Cough).  Dispense: 118 mL; Refill: 0  -     fluticasone propionate (FLONASE) 50 mcg/actuation nasal spray; 2 sprays (100 mcg total) by Each Nostril route once daily.  Dispense: 15.8 mL; Refill: 0                Labs: Influenza a and B negative.  COVID negative.  Rapid strep negative.    Decadron 10 mg IM in clinic.  Patient tolerated well.  No complications noted.    Take medications as prescribed.    Tylenol/Motrin per package instructions for any pain or fever.    Assure adequate hydration.    Nasal saline flushes or irrigation as directed.    Follow-up with PCP in 1-2 days.    Return to clinic as needed.    To ED for any new or acutely worsening symptoms.    Patient in agreement with plan of care.    DISCLAIMER: Please note that my documentation in this Electronic Healthcare Record was produced using speech recognition software and therefore may contain errors related to that software system.These could include grammar, punctuation and spelling errors or the inclusion/exclusion of phrases that were not intended. Garbled syntax, mangled pronouns, and  other bizarre constructions may be attributed to that software system.

## 2024-06-23 ENCOUNTER — OFFICE VISIT (OUTPATIENT)
Dept: URGENT CARE | Facility: CLINIC | Age: 39
End: 2024-06-23
Payer: COMMERCIAL

## 2024-06-23 VITALS
RESPIRATION RATE: 18 BRPM | WEIGHT: 215 LBS | HEIGHT: 68 IN | HEART RATE: 60 BPM | OXYGEN SATURATION: 96 % | SYSTOLIC BLOOD PRESSURE: 112 MMHG | BODY MASS INDEX: 32.58 KG/M2 | DIASTOLIC BLOOD PRESSURE: 75 MMHG | TEMPERATURE: 98 F

## 2024-06-23 DIAGNOSIS — J01.91 ACUTE RECURRENT SINUSITIS, UNSPECIFIED LOCATION: Primary | ICD-10-CM

## 2024-06-23 PROCEDURE — 99214 OFFICE O/P EST MOD 30 MIN: CPT | Mod: S$GLB,,, | Performed by: STUDENT IN AN ORGANIZED HEALTH CARE EDUCATION/TRAINING PROGRAM

## 2024-06-23 RX ORDER — EZETIMIBE 10 MG/1
10 TABLET ORAL
COMMUNITY
Start: 2024-05-06

## 2024-06-23 RX ORDER — FLUTICASONE PROPIONATE 50 MCG
1 SPRAY, SUSPENSION (ML) NASAL DAILY
Qty: 11.1 ML | Refills: 0 | Status: SHIPPED | OUTPATIENT
Start: 2024-06-23

## 2024-06-23 RX ORDER — AZITHROMYCIN 250 MG/1
TABLET, FILM COATED ORAL
Qty: 6 TABLET | Refills: 0 | Status: SHIPPED | OUTPATIENT
Start: 2024-06-23

## 2024-06-23 RX ORDER — MONTELUKAST SODIUM 10 MG/1
10 TABLET ORAL NIGHTLY
Qty: 30 TABLET | Refills: 0 | Status: SHIPPED | OUTPATIENT
Start: 2024-06-23 | End: 2024-07-23

## 2024-06-23 RX ORDER — ROSUVASTATIN CALCIUM 10 MG/1
10 TABLET, COATED ORAL NIGHTLY
COMMUNITY
Start: 2024-06-23

## 2024-06-23 NOTE — PROGRESS NOTES
"Subjective:      Patient ID: Trace Bird III is a 38 y.o. male.    Vitals:  height is 5' 8" (1.727 m) and weight is 97.5 kg (215 lb). His temperature is 98.3 °F (36.8 °C). His blood pressure is 112/75 and his pulse is 60. His respiration is 18 and oxygen saturation is 96%.     Chief Complaint: Sinus Problem    Pt states Tuesday began with sinus issues and chills, and felt feverish. Continuing to have sinus issues, headache, and pain in back of neck that is not getting better. Pt has taken zyrtec, ibuprofen, and tylenol with no relief.  States recurrent sinus infections every couple of months.    Sinus Problem  This is a new problem. The current episode started in the past 7 days. The problem is unchanged. Associated symptoms include congestion, headaches and sinus pressure.       Constitution: Negative for fever.   HENT:  Positive for congestion and sinus pressure.    Respiratory:  Positive for sputum production.    Neurological:  Positive for headaches.      Objective:     Physical Exam   Constitutional: He is oriented to person, place, and time. He appears well-developed. He is cooperative.  Non-toxic appearance. He does not appear ill. No distress.   HENT:   Head: Normocephalic and atraumatic.   Ears:   Right Ear: Hearing, tympanic membrane, external ear and ear canal normal.   Left Ear: Hearing, tympanic membrane, external ear and ear canal normal.   Nose: Rhinorrhea and congestion present. No mucosal edema or nasal deformity. No epistaxis. Right sinus exhibits no maxillary sinus tenderness and no frontal sinus tenderness. Left sinus exhibits no maxillary sinus tenderness and no frontal sinus tenderness.   Mouth/Throat: Uvula is midline, oropharynx is clear and moist and mucous membranes are normal. No trismus in the jaw. Normal dentition. No uvula swelling. No oropharyngeal exudate, posterior oropharyngeal edema or posterior oropharyngeal erythema.   Eyes: Conjunctivae and lids are normal. No scleral icterus. "   Neck: Trachea normal and phonation normal. Neck supple. No edema present. No erythema present. No neck rigidity present.   Cardiovascular: Normal rate, regular rhythm, normal heart sounds and normal pulses.   Pulmonary/Chest: Effort normal and breath sounds normal. No respiratory distress. He has no decreased breath sounds. He has no rhonchi.   Abdominal: Normal appearance.   Musculoskeletal: Normal range of motion.         General: No deformity. Normal range of motion.   Neurological: He is alert and oriented to person, place, and time. He exhibits normal muscle tone. Coordination normal.   Skin: Skin is warm, dry, intact, not diaphoretic and not pale.   Psychiatric: His speech is normal and behavior is normal. Judgment and thought content normal.   Nursing note and vitals reviewed.      Assessment:     1. Acute recurrent sinusitis, unspecified location        Plan:       Acute recurrent sinusitis, unspecified location  -     Ambulatory referral/consult to ENT    Other orders  -     fluticasone propionate (FLONASE) 50 mcg/actuation nasal spray; 1 spray (50 mcg total) by Each Nostril route once daily.  Dispense: 11.1 mL; Refill: 0  -     azithromycin (Z-FRED) 250 MG tablet; Take 2 tablets by mouth on day 1; Take 1 tablet by mouth on days 2-5  Dispense: 6 tablet; Refill: 0  -     montelukast (SINGULAIR) 10 mg tablet; Take 1 tablet (10 mg total) by mouth every evening.  Dispense: 30 tablet; Refill: 0              Maxillary sinuses are tender to palpation.  Requesting a ENT referral.  - To ED for any new or acutely worsening symptoms including but not limited to chest pain, palpitations, shortness of breath, or fever greater than 103° F.  Patient in agreement with plan of care.                - The diagnosis, treatment plan, instructions for follow-up and reevaluation as well as ED precautions were discussed and understanding was verbalized. All questions or concerns have been addressed.

## 2024-07-09 ENCOUNTER — OFFICE VISIT (OUTPATIENT)
Dept: OTOLARYNGOLOGY | Facility: CLINIC | Age: 39
End: 2024-07-09
Payer: COMMERCIAL

## 2024-07-09 ENCOUNTER — TELEPHONE (OUTPATIENT)
Dept: OTOLARYNGOLOGY | Facility: CLINIC | Age: 39
End: 2024-07-09
Payer: COMMERCIAL

## 2024-07-09 VITALS
HEIGHT: 68 IN | HEART RATE: 56 BPM | SYSTOLIC BLOOD PRESSURE: 97 MMHG | WEIGHT: 221.56 LBS | BODY MASS INDEX: 33.58 KG/M2 | DIASTOLIC BLOOD PRESSURE: 53 MMHG

## 2024-07-09 DIAGNOSIS — G47.30 SLEEP APNEA, UNSPECIFIED TYPE: ICD-10-CM

## 2024-07-09 DIAGNOSIS — R06.81 WITNESSED APNEIC SPELLS: ICD-10-CM

## 2024-07-09 DIAGNOSIS — M26.629 TMJPDS (TEMPOROMANDIBULAR JOINT PAIN DYSFUNCTION SYNDROME): Primary | ICD-10-CM

## 2024-07-09 DIAGNOSIS — H92.03 OTALGIA OF BOTH EARS: ICD-10-CM

## 2024-07-09 DIAGNOSIS — J32.8 OTHER CHRONIC SINUSITIS: ICD-10-CM

## 2024-07-09 DIAGNOSIS — J02.9 SORE THROAT: ICD-10-CM

## 2024-07-09 DIAGNOSIS — J35.8 CRYPTIC TONSIL: ICD-10-CM

## 2024-07-09 DIAGNOSIS — R06.83 SNORING: ICD-10-CM

## 2024-07-09 DIAGNOSIS — J34.89 NASAL OBSTRUCTION: ICD-10-CM

## 2024-07-09 DIAGNOSIS — Z91.09 ENVIRONMENTAL ALLERGIES: ICD-10-CM

## 2024-07-09 LAB — GROUP A STREP, MOLECULAR: NEGATIVE

## 2024-07-09 PROCEDURE — 87651 STREP A DNA AMP PROBE: CPT | Performed by: PHYSICIAN ASSISTANT

## 2024-07-09 PROCEDURE — 87070 CULTURE OTHR SPECIMN AEROBIC: CPT | Performed by: PHYSICIAN ASSISTANT

## 2024-07-09 PROCEDURE — 99999 PR PBB SHADOW E&M-EST. PATIENT-LVL V: CPT | Mod: PBBFAC,,, | Performed by: PHYSICIAN ASSISTANT

## 2024-07-09 RX ORDER — AZELASTINE 1 MG/ML
1 SPRAY, METERED NASAL 2 TIMES DAILY
Qty: 30 ML | Refills: 2 | Status: SHIPPED | OUTPATIENT
Start: 2024-07-09 | End: 2025-07-09

## 2024-07-09 RX ORDER — CETIRIZINE HYDROCHLORIDE 10 MG/1
10 TABLET ORAL DAILY
COMMUNITY

## 2024-07-09 NOTE — PATIENT INSTRUCTIONS
TMJ Syndrome  This is a condition with chronic or recurrent pain in the joint of the jaw (in front of the ear). The pain may cause limited motion of the jaw, a locking or catching sensation, clicking, popping or grinding sounds from the joint with movement. It may also lead to headache, earache or neck pain. It is sometimes caused by inflammation in the joint, injury or wear-and-tear of the cartilage in the joint, involuntary grinding of the teeth or poorly fitting dentures. Emotional stress and tension are often a factor. Most cases resolve completely within a few months with proper treatment.    Home Care:  1) Rest the jaw by avoiding crunchy or hard foods to chew. Do not eat hard or sticky candies. Soft foods and liquids are easier on the jaw. Protect your jaw while yawning.    2) Hot packs (small towel soaked in hot water) applied to the jaw may give relief by reducing muscle spasm. You may use a heating pad or a towel soaked in hot water. Some people get relief with cold packs, so try both and see which one works best for you.    3) You may use ibuprofen (Motrin, Advil) to control pain, unless another medicine was prescribed. [ NOTE : If you have chronic liver or kidney disease or ever had a stomach ulcer or GI bleeding, talk with your doctor before using these medicines.]  - Typical dosing in an adult for anti-inflammatory     4) If you suspect emotional stress is related to your condition:    a) Try to identify the sources of stress in your life. It may not be obvious! These may include:  -- Daily hassles of life that pile up (traffic jams, missed appointments, car troubles)  -- Major life changes, both good (new baby, job promotion) and bad (loss of job, loss of loved one)  -- Overload: feeling that you have too many responsibilities and can't take care of everything at once  -- Helplessness: feeling like your problems are more than you can solve    b) When possible, do something about the source of your  stress: avoid hassles, limit the amount of change that is happening in your life at one time and take a break when you feel overloaded.    c) Unfortunately, many stressful situations cannot be avoided. Therefore, it is necessary to learn HOW TO MANAGE STRESS better. There are many proven methods that work and will reduce your anxiety. These include simple things like exercise, good nutrition and adequate rest. Also, there are certain techniques that are helpful: relaxation and breathing exercises, visualization, biofeedback, meditation or simply taking some time-out to clear your mind. For more information about this, consult your doctor or go to a local bookstore and review the many books and tapes available on this subject.    Follow up with dental provider for TMJD.     Use NeilMed sinus rinse two times daily. This can be bought over the counter. Make sure to use distilled water when using sinus irrigations. Use salt packs and mix in solution. Use Neilmed sinus rinses twice daily (morning and evening) followed by flonase 1 spray (50mcg total) by Each Nostril route 2 (two) times daily and astelin 1 spray (137mcg total) by Nasal route (two) times daily. Point up and slightly outward toward ear when using medicated nasal sprays as to avoid irritating nasal septum.           DIRECTIONS FOR SINUS SALINE RINSE To see demonstration: Enter http://[email or web address]/[email or web address]?v=[email or web address] into the browser address box, or go to You tube, and under the search box, enter sinus rinse. Click on NeilMed Sinus Rinse Video    Step 1    Step 1 Please wash your hands. Fill the clean bottle with the designated volume of warm distilled water, filtered water or previously boiled water. You may warm the water in a microwave but we recommend that you warm it in increments of five seconds. This is to avoid excessive heating of the water and damage to the device or scalding your nasal passage.    Step 2    Step 2 Cut the SINUS RINSE  mixture packet at the corner and pour its contents into the bottle. Tighten the cap & tube on the bottle securely, place one finger over the tip of the cap and shake the bottle gently to dissolve the mixture.      Step 3    Step 3 Standing in front of a sink, bend forward to your comfort level and tilt your head down. Keeping your mouth open without holding your breath, place cap snugly against your nasal passage and SQUEEZE BOTTLE GENTLY until the solution starts draining from the OPPOSITE nasal passage or from your mouth. Keep squeezing the bottle GENTLY until at least 1/4 to 1/2 (60 to 120 mL) of the bottle is used for a proper rinse. Do not swallow the solution.    Step 4    Blow your nose gently, without pinching your nose completely because this will apply pressure on the eardrums. If tolerable, sniff in any residual solution remaining in the nasal passage once or twice prior to blowing your nose as this may clean out the posterior nasopharyngeal area (the area at the back of your nasal passage). Some solution will reach the back of the throat, so please spit it out. To help improve drainage of any residual solution, blow your nose gently while tilting your head to the opposite side of the nasal passage that you just rinsed.    Step 5    Now repeat steps 3 & 4 for your other nasal passage.    Step 6     Air dry the Sinus Rinse bottle, cap, and tube on a clean paper towel, a glass plate to store the bottle cap and tube. If there is any solution leftover, please discard it. We recommend you make a fresh solution each time you rinse. Rinse 5 times each day, OR as directed by your physician. Warnings: DO NOT RINSE IF NASAL PASSAGE IS COMPLETELY BLOCKED OR IF YOU HAVE AN EAR INFECTION OR BLOCKED EARS. If you have had ear surgery, please contact your physician prior to irrigation. If you experience any pressure in your ears, stop the rinse and get further directions from your physician or contact our office during  regular business hours. To avoid any ear discomfort: Heat the solution to lukewarm, do not use hot, boiling or cold water. Keep your mouth open. Do not hold your breath and if possible make the sound BEAR....BEAR... Make sure to take the position as shown. Gently squeeze 1/4 of the bottle at a time (60mL / 2 ounces). Stop the rinse if you feel any solution sensation near the ears. You may rinse with a partially blocked nasal passage. Please do not use for any other purposes. Please rinse at least ONE HOUR PRIOR to bedtime, in order to avoid any residual solution dripping in the throat.    >> Before using the SINUS RINSE kit, please inspect the cap, tube and bottle carefully for wear and tear. If any of the components appear discolored or cracked, please contact [facility] to obtain a replacement. You must follow the cleaning instructions provided in this brochure or cleaning instruction card prior to each use.    >> The SINUS RINSE bottle and mixture are to be used only for nasalirrigation. Do not use for any other purposes.    >> We recommend that you use the rinse ONE HOUR PRIOR to bedtime in order to avoid any residual solution dripping in the throat.    Tips to avoid ear discomfort while rinsing    If you have had ear surgery, please contact your physician prior to irrigation. Do not use if you have an ear infection or blocked ears. Rinse with lukewarm water. Keep your mouth open. Do not hold your breath while rinsing. While rinsing, make sure to tilt your. Gently squeeze the bottle while rinsing; do not squeeze the bottle very forcefully. Stop the rinse if you feel a sensation of fluid near your ears.    Tips to avoid unexpected drainage after rinsing    In rare situations, especially if you have had sinus surgery, the saline solution can pool in the sinus cavities and nasal passages and then drip from your nostrils hours after rinsing. To avoid this harmless but annoying inconvenience, take one extra step after  rinsing: lean forward, tilt your head sideways and gently blow your nose. Then, tilt your head to the other side and blow again. You may need to repeat this several times. This will help rid your nasal passages of any excess mucus and remaining saline solution. If you find yourself experiencing delayed drainage often, do not rinse right before leaving your house or going to bed.

## 2024-07-09 NOTE — TELEPHONE ENCOUNTER
Spoke w/pt per call back msg received.  Pt states that he  is running late to appt.  States that he clicked on link that was sent to him via text message to get to office, but took him to other side of [facility], by [facility].  Apologized to pt; told him we will still see him (per provider approval).  Pt acknowledged; Pt has no further ENT questions/concerns at this time.

## 2024-07-09 NOTE — PROGRESS NOTES
[facility]    Subjective:      Patient: Trace Bird III Patient PCP: Patti, Primary Doctor         :  1985     Sex:  male      MRN:  [id number]          Date of Visit: 2024      Chief Complaint: Chronic sinus complaints    Patient ID: Trace Bird III is a 38 y.o. male former smoker who presents to clinic for evaluation of recurrent sinusitis. Pt seen by  2024 and stated he was suffering from recurrent sinusitis every couple of months. Pt was started on z-mack, singulair 10mg in the evening and flonase. He was previously seen by  2024 for sinusitis and sore throat. Covid19, rapid strep and Covid19 testing were negative. Pt was treated with Augmentin 875mg BID x 10 days and dex shot. Pt states that he has issues with jaw pain. Pt states that he has episodic issues with ear pain. Pt states that he has had issues chronic sore throat for around 3 months. Pt states that he has episodic throat pain with referred ear pain, but no issues with dysphagia or odynophagia. Pt states that antibiotics have not helped. Pt denies issues with migraine. He has also has occipital headache with cervicogenic headaches. Pt has occasional discolored nasal discharge. Pt states that he has issues with nasal congestion. Pt uses flonase, which mildly helps. Pt takes zyrtec and singulair. Pt states that his smell is good. He endorses issues with environmental allergies: (seasonal allergies worse during the spring/summer). He does not recall previously having allergy testing. He denies a history of asthma. Pt endorses snoring. Pt has had witnessed apneic episodes. Pt endorse daytime fatigue. Pt has issues with daily headache and takes over the counter aspirin. No recent sleep study. He has not had sinonasal surgery. He has not had a tonsillectomy. He recalls a prior history of nasal trauma consisting of getting hit by a baseball and breaking his nose in highschool. Pt denies significant issue with acid reflux on his  "current anti-reflux regimen. ESS: 15/24.    Past Medical History  He has a past medical history of Coronary artery disease, GERD (gastroesophageal reflux disease), Helicobacter pylori (H. pylori), Hypertension, and ST elevation (STEMI) myocardial infarction of unspecified site.    Family History  His family history includes Diabetes in his father; Hypertension in his father and mother.    Past Surgical History:   Procedure Laterality Date    ANGIOGRAM, CORONARY, WITH LEFT HEART CATHETERIZATION N/A 01/30/2020    Procedure: Angiogram, Coronary, with Left Heart Cath;  Surgeon: Félix Shaikh MD;  Location: [facility] CATH/EP LAB;  Service: Cardiology;  Laterality: N/A;    CORONARY ANGIOPLASTY WITH STENT PLACEMENT      HERNIA REPAIR       Social History     Tobacco Use    Smoking status: Former     Current packs/day: 0.00     Types: Cigarettes     Quit date: 01/2021     Years since quitting: 3.5    Smokeless tobacco: Never   Substance and Sexual Activity    Alcohol use: Not Currently     Comment: Alcohol abuse, stopped since December 15, 2019    Drug use: Not Currently    Sexual activity: Not on file     Medications  He has a current medication list which includes the following prescription(s): aspirin, cetirizine, clopidogrel, enalapril, esomeprazole, ezetimibe, fluoxetine, fluticasone propionate, metoprolol tartrate, montelukast, rosuvastatin, azelastine, azithromycin, fluticasone propionate, levocetirizine, and ninjacof.    Review of patient's allergies indicates:  No Known Allergies  All medications, allergies, and past history have been reviewed.    Objective:      Vitals:      1/19/2024     8:04 AM 6/23/2024     9:36 AM 7/9/2024     1:38 PM   Vitals - 1 value per visit   SYSTOLIC 115 112 97   DIASTOLIC 74 75 53   Pulse 88 60 56   Temp 98.3 °F (36.8 °C) 98.3 °F (36.8 °C)    Resp 16 18    SPO2 96 % 96 %    Weight (lb) 220 215 221.56   Weight (kg) 99.791 97.523 100.5   Height 5' 8" (1.727 m) 5' 8" (1.727 m) 5' 8" " (1.727 m)   BMI (Calculated) 33.5 32.7 33.7   Pain Score   Two       Body surface area is 2.2 meters squared.  Physical Exam  Constitutional:       General: He is not in acute distress.     Appearance: Normal appearance. He is not ill-appearing.   HENT:      Head: Normocephalic and atraumatic.      Right Ear: Tympanic membrane, ear canal and external ear normal.      Left Ear: Tympanic membrane, ear canal and external ear normal.      Nose: Septal deviation present.      Comments: Right greater than left inferior turbinate hypertrophy.      Mouth/Throat:      Lips: Pink. No lesions.      Mouth: Mucous membranes are moist. No oral lesions.      Tongue: No lesions.      Palate: No lesions.      Pharynx: Oropharynx is clear. Uvula midline. No pharyngeal swelling, oropharyngeal exudate, posterior oropharyngeal erythema or uvula swelling.      Tonsils: No tonsillar exudate.      Comments: Pain with crepitus right greater than left upon TMJ evaluation today in office.     [place] tonsils between 2-3+ with tonsillary crypts  Eyes:      General:         Right eye: No discharge.         Left eye: No discharge.      Extraocular Movements: Extraocular movements intact.      Conjunctiva/sclera: Conjunctivae normal.   Pulmonary:      Effort: Pulmonary effort is normal.   Neurological:      General: No focal deficit present.      Mental Status: He is alert and oriented to person, place, and time. Mental status is at baseline.   Psychiatric:         Mood and Affect: Mood normal.         Behavior: Behavior normal.         Thought Content: Thought content normal.         Judgment: Judgment normal.     Laryngoscopy     Date/Time: 7/9/2024 1:00 PM     Performed by: Michael Viramontes PA-C  Authorized by: Michael Viramontes PA-C    Anesthesia:     Local anesthetic:  Lidocaine 4% and Rashaad-Synephrine 1/2%    Patient tolerance:  Patient tolerated the procedure well with no immediate complications    Decongestion performed?: Yes     Laryngoscopy:     Areas examined:  Nasal cavities, nasopharynx, oropharynx, hypopharynx, larynx and vocal cords    Laryngoscope size: disposable ambu flexible scope.  Nose External:      No external nasal deformity  Nose Intranasal:      Mucosa no polyps     Mucosa ulcers not present     No mucosa lesions present     No septum gross deformity     Enlarged turbinates (right greater than left inferior turbinate hypertrophy)  Nasopharynx:      No mucosa lesions     Adenoids present     Posterior choanae patent     Eustachian tube patent  Larynx/hypopharynx:      No epiglottis lesions     No epiglottis edema     No AE folds lesions     No vocal cord polyps     Equal and normal bilateral     No hypopharynx lesions     No piriform sinus pooling     No piriform sinus lesions     No post cricoid edema     No post cricoid erythema     Good adduction of true vocal cords with phonation. Good abduction of true vocal cords with inspiration. No vocal cord pareses/paralysis.    Labs:  WBC   Date Value Ref Range Status   01/24/2023 5.96 3.90 - 12.70 K/uL Final     Eosinophil %   Date Value Ref Range Status   01/24/2023 2.3 0.0 - 8.0 % Final     Eos #   Date Value Ref Range Status   01/24/2023 0.1 0.0 - 0.5 K/uL Final     Platelets   Date Value Ref Range Status   01/24/2023 290 150 - 450 K/uL Final     Glucose   Date Value Ref Range Status   01/24/2023 108 70 - 110 mg/dL Final     All lab results, imaging results, and data have been reviewed.    Assessment:        ICD-10-CM ICD-9-CM   1. TMJPDS (temporomandibular joint pain dysfunction syndrome)  M26.629 524.69   2. Sore throat  J02.9 462   3. Sleep apnea, unspecified type  G47.30 780.57   4. Snoring  R06.83 786.09   5. Environmental allergies  Z91.09 V15.09   6. Witnessed apneic spells  R06.81 786.03   7. Otalgia of both ears  H92.03 388.70   8. Other chronic sinusitis  J32.8 473.8   9. Nasal obstruction  J34.89 478.19   10. Cryptic tonsil  J35.8 474.8            Plan:           Pt has neck issues. Advised pt about cervicogenic headache causing pain from neck to occipital region of head. Advised pt that he can use warm compresses when this occurs. TMJ evaluation shows issues with TMJD. Pt advised about TMJD and episodic referred ear pain. Normal otologic examination today in office. Pt is to follow up with dental provider Dr. Pato Ritter DDS for evaluation for TMJD. Pt advised that he can use saline gargles and dental water pick if having tonsil stones. Throat examination unremarkable for tonsillar erythema or exudate. Will get rapid strep and throat culture to further assess. Advised pt about the possibility of headache and abnormal facial pain/pressure being possibly caused by untreated sleep apnea vs sinusitis, which may be why he has had limited benefit from oral abx. Pt advised to use NeilMed sinus rinse two times daily. This can be bought over the counter. Make sure to use distilled water when using sinus irrigations. Use salt packs and mix in solution. Use Neilmed sinus rinses twice daily (morning and evening) followed by flonase 1 spray (50mcg total) by Each Nostril route 2 (two) times daily and astelin 1 spray (137mcg total) by Nasal route (two) times daily. Point up and slightly outward toward ear when using medicated nasal sprays as to avoid irritating nasal septum. Orders placed for home sleep study. Will also check TSH in setting of possible sleep apnea. We will proceed with region 6 aeroallergen panel to assess allergies. We will proceed with CT medronic sinus WO to further assess for significant chronic sinus disease. Our office will reach out with results and recommendations of above testing and if needed, follow up will be created dependant upon results and recommendations of testing.

## 2024-07-09 NOTE — TELEPHONE ENCOUNTER
----- Message from Jenelle Avila sent at 7/9/2024  1:08 PM CDT -----  Type:  Needs Medical Advice    Who Called: pt     Best Call Back Number: [phone]      Additional Information: pt st that he went to wrong clinic is on his way. please call to discuss.

## 2024-07-11 LAB — BACTERIA THROAT CULT: NORMAL

## 2024-07-17 ENCOUNTER — LAB VISIT (OUTPATIENT)
Dept: LAB | Facility: HOSPITAL | Age: 39
End: 2024-07-17
Attending: PHYSICIAN ASSISTANT
Payer: COMMERCIAL

## 2024-07-17 ENCOUNTER — HOSPITAL ENCOUNTER (OUTPATIENT)
Dept: RADIOLOGY | Facility: HOSPITAL | Age: 39
Discharge: HOME | End: 2024-07-17
Attending: PHYSICIAN ASSISTANT
Payer: COMMERCIAL

## 2024-07-17 DIAGNOSIS — Z91.09 ENVIRONMENTAL ALLERGIES: ICD-10-CM

## 2024-07-17 DIAGNOSIS — G47.30 SLEEP APNEA, UNSPECIFIED TYPE: ICD-10-CM

## 2024-07-17 DIAGNOSIS — J32.8 OTHER CHRONIC SINUSITIS: ICD-10-CM

## 2024-07-17 LAB — TSH SERPL DL<=0.005 MIU/L-ACNC: 0.47 UIU/ML (ref 0.4–4)

## 2024-07-17 PROCEDURE — 36415 COLL VENOUS BLD VENIPUNCTURE: CPT | Performed by: PHYSICIAN ASSISTANT

## 2024-07-17 PROCEDURE — 86003 ALLG SPEC IGE CRUDE XTRC EA: CPT | Performed by: PHYSICIAN ASSISTANT

## 2024-07-17 PROCEDURE — 84443 ASSAY THYROID STIM HORMONE: CPT | Performed by: PHYSICIAN ASSISTANT

## 2024-07-17 PROCEDURE — 70486 CT MAXILLOFACIAL W/O DYE: CPT | Mod: 26,,, | Performed by: RADIOLOGY

## 2024-07-17 PROCEDURE — 86003 ALLG SPEC IGE CRUDE XTRC EA: CPT | Mod: 59 | Performed by: PHYSICIAN ASSISTANT

## 2024-07-17 PROCEDURE — 70486 CT MAXILLOFACIAL W/O DYE: CPT | Mod: TC

## 2024-07-23 ENCOUNTER — PROCEDURE VISIT (OUTPATIENT)
Dept: SLEEP MEDICINE | Facility: HOSPITAL | Age: 39
End: 2024-07-23
Attending: PHYSICIAN ASSISTANT
Payer: COMMERCIAL

## 2024-07-23 DIAGNOSIS — R06.81 WITNESSED APNEIC SPELLS: ICD-10-CM

## 2024-07-23 DIAGNOSIS — G47.30 SLEEP APNEA, UNSPECIFIED TYPE: ICD-10-CM

## 2024-07-23 LAB
A ALTERNATA IGE QN: <0.1 KU/L
A FUMIGATUS IGE QN: <0.1 KU/L
BERMUDA GRASS IGE QN: <0.1 KU/L
CAT DANDER IGE QN: <0.1 KU/L
CEDAR IGE QN: <0.1 KU/L
COMMON RAGWEED IGE QN: 0.1 KU/L
D FARINAE IGE QN: <0.1 KU/L
D PTERONYSS IGE QN: <0.1 KU/L
DEPRECATED A ALTERNATA IGE RAST QL: NORMAL
DEPRECATED A FUMIGATUS IGE RAST QL: NORMAL
DEPRECATED BERMUDA GRASS IGE RAST QL: NORMAL
DEPRECATED CAT DANDER IGE RAST QL: NORMAL
DEPRECATED CEDAR IGE RAST QL: NORMAL
DEPRECATED COMMON RAGWEED IGE RAST QL: NORMAL
DEPRECATED D FARINAE IGE RAST QL: NORMAL
DEPRECATED D PTERONYSS IGE RAST QL: NORMAL
DEPRECATED DOG DANDER IGE RAST QL: NORMAL
DEPRECATED ELDER IGE RAST QL: ABNORMAL
DEPRECATED ENGL PLANTAIN IGE RAST QL: NORMAL
DEPRECATED PECAN/HICK TREE IGE RAST QL: NORMAL
DEPRECATED TIMOTHY IGE RAST QL: NORMAL
DEPRECATED WHITE OAK IGE RAST QL: NORMAL
DOG DANDER IGE QN: <0.1 KU/L
ELDER IGE QN: 0.81 KU/L
ENGL PLANTAIN IGE QN: <0.1 KU/L
PECAN/HICK TREE IGE QN: <0.1 KU/L
TIMOTHY IGE QN: <0.1 KU/L
WHITE OAK IGE QN: <0.1 KU/L

## 2024-07-23 PROCEDURE — 95806 SLEEP STUDY UNATT&RESP EFFT: CPT

## 2024-08-05 ENCOUNTER — PATIENT MESSAGE (OUTPATIENT)
Dept: OTOLARYNGOLOGY | Facility: CLINIC | Age: 39
End: 2024-08-05
Payer: COMMERCIAL

## 2024-08-05 DIAGNOSIS — G47.33 OSA (OBSTRUCTIVE SLEEP APNEA): Primary | ICD-10-CM

## 2024-08-27 ENCOUNTER — TELEPHONE (OUTPATIENT)
Dept: PULMONOLOGY | Facility: CLINIC | Age: 39
End: 2024-08-27
Payer: COMMERCIAL

## 2024-09-19 ENCOUNTER — OFFICE VISIT (OUTPATIENT)
Dept: PULMONOLOGY | Facility: CLINIC | Age: 39
End: 2024-09-19
Payer: COMMERCIAL

## 2024-09-19 VITALS
DIASTOLIC BLOOD PRESSURE: 71 MMHG | BODY MASS INDEX: 33.43 KG/M2 | WEIGHT: 220.56 LBS | HEIGHT: 68 IN | HEART RATE: 67 BPM | OXYGEN SATURATION: 97 % | SYSTOLIC BLOOD PRESSURE: 120 MMHG

## 2024-09-19 DIAGNOSIS — G47.33 OSA (OBSTRUCTIVE SLEEP APNEA): Primary | ICD-10-CM

## 2024-09-19 PROCEDURE — 3074F SYST BP LT 130 MM HG: CPT | Mod: CPTII,S$GLB,, | Performed by: NURSE PRACTITIONER

## 2024-09-19 PROCEDURE — 99203 OFFICE O/P NEW LOW 30 MIN: CPT | Mod: S$GLB,,, | Performed by: NURSE PRACTITIONER

## 2024-09-19 PROCEDURE — 1159F MED LIST DOCD IN RCRD: CPT | Mod: CPTII,S$GLB,, | Performed by: NURSE PRACTITIONER

## 2024-09-19 PROCEDURE — 3008F BODY MASS INDEX DOCD: CPT | Mod: CPTII,S$GLB,, | Performed by: NURSE PRACTITIONER

## 2024-09-19 PROCEDURE — 4010F ACE/ARB THERAPY RXD/TAKEN: CPT | Mod: CPTII,S$GLB,, | Performed by: NURSE PRACTITIONER

## 2024-09-19 PROCEDURE — 1160F RVW MEDS BY RX/DR IN RCRD: CPT | Mod: CPTII,S$GLB,, | Performed by: NURSE PRACTITIONER

## 2024-09-19 PROCEDURE — 99999 PR PBB SHADOW E&M-EST. PATIENT-LVL IV: CPT | Mod: PBBFAC,,, | Performed by: NURSE PRACTITIONER

## 2024-09-19 PROCEDURE — 3078F DIAST BP <80 MM HG: CPT | Mod: CPTII,S$GLB,, | Performed by: NURSE PRACTITIONER

## 2024-09-19 RX ORDER — METFORMIN HYDROCHLORIDE 500 MG/1
500 TABLET ORAL 2 TIMES DAILY
COMMUNITY
Start: 2024-08-01

## 2024-09-19 NOTE — PROGRESS NOTES
9/19/2024    Trace Bird III  New Patient Consult    Chief Complaint   Patient presents with    Sleep Apnea       HPI: 9/19/2024- Patient presents to clinic for review of recent sleep study and to discuss sleep apnea options. First noticed he had sleep issues roughly 15 years ago. States he doesn't feel that he gets a great quality of sleep, snores while sleeping, has daytime somnolence, nighttime urination, memory issues, and remembers dreams frequently. Reports wife has told him that he stops breathing while he sleeps and that he snores very loudly.     Sleep study A[facility] 7/23/24 5.5    Social Hx: works as a . Denies Asbestosis exposure, Smoking Hx: former smoker, quit in 2021, only smoked 2 years total, would smoke 2 PPD.   Family Hx: Denies familial history of Lung Cancer, COPD, or Asthma.  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       The chief compliant  problem is new to me  PFSH:  Past Medical History:   Diagnosis Date    Coronary artery disease     GERD (gastroesophageal reflux disease)     Helicobacter pylori (H. pylori)     Hypertension     ST elevation (STEMI) myocardial infarction of unspecified site          Past Surgical History:   Procedure Laterality Date    ANGIOGRAM, CORONARY, WITH LEFT HEART CATHETERIZATION N/A 01/30/2020    Procedure: Angiogram, Coronary, with Left Heart Cath;  Surgeon: Félix Shaikh MD;  Location: [facility] CATH/EP LAB;  Service: Cardiology;  Laterality: N/A;    CORONARY ANGIOPLASTY WITH STENT PLACEMENT      HERNIA REPAIR       Social History     Tobacco Use    Smoking status: Former     Current packs/day: 0.00     Types: Cigarettes     Quit date: 01/2021     Years since quitting: 3.7    Smokeless tobacco: Never   Substance Use Topics    Alcohol use: Not Currently      "Comment: Alcohol abuse, stopped since December 15, 2019    Drug use: Not Currently     Family History   Problem Relation Name Age of Onset    Hypertension Mother      Diabetes Father      Hypertension Father       Review of patient's allergies indicates:  No Known Allergies  I have reviewed past medical, family, and social history. I have reviewed previous nurse notes.        Performance Status:The patient's activity level is regular exercise.          Review of Systems   Constitutional: Negative for activity change, appetite change, chills, diaphoresis, fatigue, fever and unexpected weight change.   HENT: Negative for dental problem, postnasal drip, rhinorrhea, sinus pressure, sinus pain, sneezing, sore throat, trouble swallowing and voice change.    Respiratory: Negative for apnea, cough, chest tightness, shortness of breath, wheezing and stridor.    Cardiovascular: Negative for chest pain, palpitations and leg swelling.   Gastrointestinal: Negative for abdominal distention, abdominal pain, constipation and nausea.   Musculoskeletal: Negative for gait problem, myalgias and neck pain.   Skin: Negative for color change and pallor.   Allergic/Immunologic: Negative for environmental allergies and food allergies.   Neurological: Negative for dizziness, speech difficulty, weakness, light-headedness, numbness and headaches.   Hematological: Negative for adenopathy. Does not bruise/bleed easily.   Psychiatric/Behavioral: Negative for dysphoric mood and sleep disturbance. The patient is not nervous/anxious.           Exam:Comprehensive exam done. /71 (BP Location: Left arm, Patient Position: Sitting, BP Method: Medium (Automatic))   Pulse 67   Ht 5' 8" (1.727 m)   Wt 100 kg (220 lb 9.1 oz)   SpO2 97% Comment: on room air at rest  BMI 33.54 kg/m²   Exam included Vitals as listed  Constitutional:  oriented to person, place, and time. He appears well-developed. No distress.   Nose: Nose normal.   Mouth/Throat: " Uvula is midline, oropharynx is clear and moist and mucous membranes are normal. No dental caries. No oropharyngeal exudate, posterior oropharyngeal edema, posterior oropharyngeal erythema or tonsillar abscesses.  Mallapatti (M) score  Eyes: Pupils are equal, round, and reactive to light.   Neck: No JVD present. No thyromegaly present.   Cardiovascular: Normal rate, regular rhythm and normal heart sounds. Exam reveals no gallop and no friction rub.   No murmur heard.  Pulmonary/Chest: Effort normal and breath sounds normal. No accessory muscle usage or stridor. No apnea and no tachypnea. No respiratory distress, decreased breath sounds, wheezes, rhonchi, rales, or tenderness.   Abdominal: Soft. exhibits no mass. There is no tenderness. No hepatosplenomegaly, hernias and normoactive bowel sounds  Musculoskeletal: Normal range of motion. exhibits no edema.   Lymphadenopathy:     no cervical adenopathy.     no axillary adenopathy.   Neurological:  alert and oriented to person, place, and time. not disoriented.   Skin: Skin is warm and dry. Capillary refill takes less 2 sec. No cyanosis or erythema. No pallor. Nails show no clubbing.   Psychiatric: normal mood and affect. behavior is normal. Judgment and thought content normal.       Radiographs (ct chest and cxr) reviewed: was not done  patient imaging studies reviewed and interpreted independently. My personal interpretation of most resent images include:      Labs: Patients labs reviewed including CBC   reviewed    Lab Results   Component Value Date    WBC 5.96 01/24/2023    RBC 5.03 01/24/2023    HGB 15.6 01/24/2023    HCT 46.1 01/24/2023    MCV 92 01/24/2023    MCH 31.0 01/24/2023    MCHC 33.8 01/24/2023    RDW 12.2 01/24/2023     01/24/2023    MPV 9.9 01/24/2023    GRAN 2.1 01/24/2023    GRAN 35.0 (L) 01/24/2023    LYMPH 2.9 01/24/2023    LYMPH 49.2 (H) 01/24/2023    MONO 0.7 01/24/2023    MONO 12.1 01/24/2023    EOS 0.1 01/24/2023    BASO 0.07 01/24/2023     EOSINOPHIL 2.3 01/24/2023    BASOPHIL 1.2 01/24/2023           Plan:  Clinical impression is apparently straight forward and impression with management as below.    Trace was seen today for sleep apnea.    Diagnoses and all orders for this visit:    BRISSA (obstructive sleep apnea)  -     Ambulatory referral/consult to Pulmonology  -     CPAP FOR HOME USE  -     CPAP/BIPAP SUPPLIES            Follow up in about 3 months (around 12/19/2024), or if symptoms worsen or fail to improve.        Discussed with patient above for education the following:      There are no Patient Instructions on file for this visit.

## 2024-10-22 ENCOUNTER — OFFICE VISIT (OUTPATIENT)
Dept: FAMILY MEDICINE | Facility: CLINIC | Age: 39
End: 2024-10-22
Payer: COMMERCIAL

## 2024-10-22 VITALS
TEMPERATURE: 99 F | HEART RATE: 52 BPM | RESPIRATION RATE: 18 BRPM | HEIGHT: 66 IN | WEIGHT: 213.75 LBS | DIASTOLIC BLOOD PRESSURE: 60 MMHG | OXYGEN SATURATION: 99 % | BODY MASS INDEX: 34.35 KG/M2 | SYSTOLIC BLOOD PRESSURE: 100 MMHG

## 2024-10-22 DIAGNOSIS — F10.21 ALCOHOLISM IN REMISSION: ICD-10-CM

## 2024-10-22 DIAGNOSIS — R42 DISEQUILIBRIUM: ICD-10-CM

## 2024-10-22 DIAGNOSIS — Z51.81 VISIT FOR MONITORING PLAVIX THERAPY: ICD-10-CM

## 2024-10-22 DIAGNOSIS — F41.1 GAD (GENERALIZED ANXIETY DISORDER): ICD-10-CM

## 2024-10-22 DIAGNOSIS — Z79.82 ASPIRIN LONG-TERM USE: ICD-10-CM

## 2024-10-22 DIAGNOSIS — I10 ESSENTIAL HYPERTENSION: Chronic | ICD-10-CM

## 2024-10-22 DIAGNOSIS — F41.9 ANXIETY: ICD-10-CM

## 2024-10-22 DIAGNOSIS — E78.5 HYPERLIPIDEMIA, UNSPECIFIED HYPERLIPIDEMIA TYPE: ICD-10-CM

## 2024-10-22 DIAGNOSIS — R73.03 PREDIABETES: ICD-10-CM

## 2024-10-22 DIAGNOSIS — I25.2 OLD MYOCARDIAL INFARCT: ICD-10-CM

## 2024-10-22 DIAGNOSIS — Z87.891 FORMER SMOKER: ICD-10-CM

## 2024-10-22 DIAGNOSIS — Z00.00 ENCOUNTER FOR PREVENTIVE CARE: Primary | ICD-10-CM

## 2024-10-22 DIAGNOSIS — I25.10 CORONARY ARTERY DISEASE, UNSPECIFIED VESSEL OR LESION TYPE, UNSPECIFIED WHETHER ANGINA PRESENT, UNSPECIFIED WHETHER NATIVE OR TRANSPLANTED HEART: ICD-10-CM

## 2024-10-22 DIAGNOSIS — Z79.02 VISIT FOR MONITORING PLAVIX THERAPY: ICD-10-CM

## 2024-10-22 DIAGNOSIS — E11.40 TYPE 2 DIABETES MELLITUS WITH DIABETIC NEUROPATHY, UNSPECIFIED WHETHER LONG TERM INSULIN USE: ICD-10-CM

## 2024-10-22 PROCEDURE — 1160F RVW MEDS BY RX/DR IN RCRD: CPT | Mod: CPTII,S$GLB,, | Performed by: FAMILY MEDICINE

## 2024-10-22 PROCEDURE — 99999 PR PBB SHADOW E&M-EST. PATIENT-LVL V: CPT | Mod: PBBFAC,,, | Performed by: FAMILY MEDICINE

## 2024-10-22 PROCEDURE — 99395 PREV VISIT EST AGE 18-39: CPT | Mod: S$GLB,,, | Performed by: FAMILY MEDICINE

## 2024-10-22 PROCEDURE — 3008F BODY MASS INDEX DOCD: CPT | Mod: CPTII,S$GLB,, | Performed by: FAMILY MEDICINE

## 2024-10-22 PROCEDURE — 3078F DIAST BP <80 MM HG: CPT | Mod: CPTII,S$GLB,, | Performed by: FAMILY MEDICINE

## 2024-10-22 PROCEDURE — 3074F SYST BP LT 130 MM HG: CPT | Mod: CPTII,S$GLB,, | Performed by: FAMILY MEDICINE

## 2024-10-22 PROCEDURE — 4010F ACE/ARB THERAPY RXD/TAKEN: CPT | Mod: CPTII,S$GLB,, | Performed by: FAMILY MEDICINE

## 2024-10-22 PROCEDURE — 1159F MED LIST DOCD IN RCRD: CPT | Mod: CPTII,S$GLB,, | Performed by: FAMILY MEDICINE

## 2024-10-22 RX ORDER — FLUOXETINE 10 MG/1
CAPSULE ORAL
Qty: 60 CAPSULE | Refills: 11 | Status: SHIPPED | OUTPATIENT
Start: 2024-10-22

## 2024-10-22 RX ORDER — METOPROLOL TARTRATE 25 MG/1
12.5 TABLET, FILM COATED ORAL 2 TIMES DAILY
Qty: 90 TABLET | Refills: 0 | Status: SHIPPED | OUTPATIENT
Start: 2024-10-22

## 2024-10-24 PROBLEM — Z51.81 VISIT FOR MONITORING PLAVIX THERAPY: Status: ACTIVE | Noted: 2024-10-24

## 2024-10-24 PROBLEM — Z79.02 VISIT FOR MONITORING PLAVIX THERAPY: Status: ACTIVE | Noted: 2024-10-24

## 2024-10-24 PROBLEM — Z79.82 ASPIRIN LONG-TERM USE: Status: ACTIVE | Noted: 2024-10-24

## 2024-10-24 PROBLEM — R73.03 PREDIABETES: Status: ACTIVE | Noted: 2024-10-24

## 2024-10-24 PROBLEM — E11.40 TYPE 2 DIABETES MELLITUS WITH DIABETIC NEUROPATHY: Status: ACTIVE | Noted: 2024-10-24

## 2024-10-24 PROBLEM — I25.2 OLD MYOCARDIAL INFARCT: Status: ACTIVE | Noted: 2020-01-30

## 2024-10-24 PROBLEM — F10.21 ALCOHOLISM IN REMISSION: Status: ACTIVE | Noted: 2024-10-24

## 2024-10-24 PROBLEM — Z87.891 FORMER SMOKER: Status: ACTIVE | Noted: 2024-10-24

## 2024-10-25 NOTE — PROGRESS NOTES
Subjective:       Patient ID: Trace Bird III is a 39 y.o. male.    Chief Complaint: Establish Care    New patient visit.  Was seeing Dr. Alejandro previously.      Social history former smoker about 12 years total.  Recovering alcoholic.  Remission since December of 2019.  Exercises some.  Works as a [employer]man.      Family history hypertension.  Mother jessya diabetes mellitus type 2 and coronary artery disease.    Immunizations declined.      Past medical history.  Former smoker.  Old MI January of 2020.  BMI of 34.5.  Alcoholism in remission since December of 2019.  LAD stent was 95% block.  Sees Dr. Davis now.  Dr. Kvng cooper put the stent in.  Anxiety on Prozac.  Hypertension which is controlled.  Diabetes mellitus type 2.  Prediabetes.  Metformin for couple of months.  Diet is better.  Hyperlipidemia on Crestor and Zetia.  Plavix use aspirin use.  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      Review of Systems   Constitutional: Negative.    HENT: Negative.     Eyes: Negative.    Respiratory: Negative.     Cardiovascular: Negative.    Gastrointestinal: Negative.    Endocrine: Negative.    Genitourinary: Negative.    Musculoskeletal: Negative.    Skin: Negative.    Neurological: Negative.    Hematological: Negative.    Psychiatric/Behavioral: Negative.         Objective:      Physical Exam  Vitals reviewed.   Constitutional:       Appearance: Normal appearance. He is well-developed and normal weight.   HENT:      Head: Normocephalic and atraumatic.      Right Ear: Tympanic membrane normal.      Left Ear: Tympanic membrane normal.      Nose: Nose normal.      Mouth/Throat:      Mouth: Mucous membranes are moist.      Pharynx: No oropharyngeal exudate.   Eyes:      Conjunctiva/sclera: Conjunctivae normal.      Pupils: Pupils are equal, round, and reactive to light.   Neck:      Vascular: No carotid bruit.   Cardiovascular:      Rate and Rhythm: Normal rate and regular rhythm.      Pulses:  Normal pulses.      Heart sounds: Normal heart sounds. No murmur heard.     No gallop.   Pulmonary:      Effort: Pulmonary effort is normal.      Breath sounds: Normal breath sounds.   Abdominal:      General: Bowel sounds are normal.      Palpations: Abdomen is soft. There is no mass.      Tenderness: There is no abdominal tenderness.   Musculoskeletal:         General: Normal range of motion.      Cervical back: Normal range of motion.   Skin:     General: Skin is warm and dry.   Neurological:      General: No focal deficit present.      Mental Status: He is alert and oriented to person, place, and time.   Psychiatric:         Mood and Affect: Mood normal.         Behavior: Behavior normal.         Assessment:       1. Encounter for preventive care    2. PAOLA (generalized anxiety disorder)    3. Essential hypertension    4. Former smoker    5. Old myocardial infarct    6. BMI 34.0-34.9,adult    7. Alcoholism in remission    8. Type 2 diabetes mellitus with diabetic neuropathy, unspecified whether long term insulin use    9. Prediabetes    10. Hyperlipidemia, unspecified hyperlipidemia type    11. Aspirin long-term use    12. Visit for monitoring Plavix therapy    13. Anxiety    14. Coronary artery disease, unspecified vessel or lesion type, unspecified whether angina present, unspecified whether native or transplanted heart    15. Disequilibrium        Plan:       Encounter for preventive care    PAOLA (generalized anxiety disorder)  -     FLUoxetine 10 MG capsule; Take 1 Tablet by mouth at bedtime  Dispense: 60 capsule; Refill: 11    Essential hypertension  -     CBC Without Differential; Future; Expected date: 10/22/2024  -     Comprehensive Metabolic Panel; Future; Expected date: 10/22/2024  -     TSH; Future; Expected date: 10/22/2024    Former smoker    Old myocardial infarct    BMI 34.0-34.9,adult    Alcoholism in remission    Type 2 diabetes mellitus with diabetic neuropathy, unspecified whether long term  insulin use  -     HEMOGLOBIN A1C; Future; Expected date: 10/22/2024    Prediabetes  -     HEMOGLOBIN A1C; Future; Expected date: 10/22/2024    Hyperlipidemia, unspecified hyperlipidemia type  -     Lipid Panel; Future; Expected date: 10/22/2024    Aspirin long-term use    Visit for monitoring Plavix therapy    Anxiety    Coronary artery disease, unspecified vessel or lesion type, unspecified whether angina present, unspecified whether native or transplanted heart    Disequilibrium    Other orders  -     metoprolol tartrate (LOPRESSOR) 25 MG tablet; Take 0.5 tablets (12.5 mg total) by mouth 2 (two) times daily.  Dispense: 90 tablet; Refill: 0      Check A1c lipid CBC CMP TSH.  Refill Lopressor.  Refill Prozac.

## 2024-12-11 ENCOUNTER — OFFICE VISIT (OUTPATIENT)
Dept: URGENT CARE | Facility: CLINIC | Age: 39
End: 2024-12-11
Payer: COMMERCIAL

## 2024-12-11 VITALS
WEIGHT: 213 LBS | DIASTOLIC BLOOD PRESSURE: 79 MMHG | BODY MASS INDEX: 34.23 KG/M2 | RESPIRATION RATE: 18 BRPM | OXYGEN SATURATION: 98 % | HEIGHT: 66 IN | SYSTOLIC BLOOD PRESSURE: 116 MMHG | HEART RATE: 64 BPM | TEMPERATURE: 98 F

## 2024-12-11 DIAGNOSIS — R05.1 ACUTE COUGH: ICD-10-CM

## 2024-12-11 DIAGNOSIS — J00 ACUTE NASOPHARYNGITIS: Primary | ICD-10-CM

## 2024-12-11 PROCEDURE — 96372 THER/PROPH/DIAG INJ SC/IM: CPT | Mod: S$GLB,,, | Performed by: NURSE PRACTITIONER

## 2024-12-11 PROCEDURE — 99214 OFFICE O/P EST MOD 30 MIN: CPT | Mod: 25,S$GLB,, | Performed by: NURSE PRACTITIONER

## 2024-12-11 RX ORDER — DEXAMETHASONE SODIUM PHOSPHATE 4 MG/ML
8 INJECTION, SOLUTION INTRA-ARTICULAR; INTRALESIONAL; INTRAMUSCULAR; INTRAVENOUS; SOFT TISSUE
Status: COMPLETED | OUTPATIENT
Start: 2024-12-11 | End: 2024-12-11

## 2024-12-11 RX ORDER — CODEINE PHOSPHATE AND GUAIFENESIN 10; 100 MG/5ML; MG/5ML
10 SOLUTION ORAL EVERY 6 HOURS PRN
Qty: 118 ML | Refills: 0 | Status: SHIPPED | OUTPATIENT
Start: 2024-12-11

## 2024-12-11 RX ADMIN — DEXAMETHASONE SODIUM PHOSPHATE 8 MG: 4 INJECTION, SOLUTION INTRA-ARTICULAR; INTRALESIONAL; INTRAMUSCULAR; INTRAVENOUS; SOFT TISSUE at 09:12

## 2024-12-11 NOTE — PROGRESS NOTES
"Subjective:      Patient ID: Trace Bird III is a 39 y.o. male.    Vitals:  height is 5' 6" (1.676 m) and weight is 96.6 kg (213 lb). His temperature is 98.1 °F (36.7 °C). His blood pressure is 116/79 and his pulse is 64. His respiration is 18 and oxygen saturation is 98%.     Chief Complaint: Sore Throat and Cough    Sore throat began yesterday morning, cough, and green mucus.     Sore Throat   This is a new problem. The current episode started yesterday. Associated symptoms include coughing and a hoarse voice. Treatments tried: throat losenges. The treatment provided no relief.       HENT:  Positive for sore throat.    Respiratory:  Positive for cough.       Objective:     Physical Exam    Assessment:     No diagnosis found.    Plan:       There are no diagnoses linked to this encounter.                "

## 2024-12-11 NOTE — PROGRESS NOTES
"Subjective:      Patient ID: Trace Bird III is a 39 y.o. male.    Vitals:  height is 5' 6" (1.676 m) and weight is 96.6 kg (213 lb). His temperature is 98.1 °F (36.7 °C). His blood pressure is 116/79 and his pulse is 64. His respiration is 18 and oxygen saturation is 98%.     Chief Complaint: Sore Throat and Cough    Patient presents to urgent care with complaints cough, sore throat, and body aches which began last night.  The cough kept him awake and he was unable to rest.  Denies fever, headache, sinus congestion.        Constitution: Negative for chills and fever.   HENT:  Positive for sore throat.    Respiratory:  Positive for cough.    Gastrointestinal:  Positive for nausea. Negative for vomiting.      Objective:     Physical Exam   Constitutional: He is oriented to person, place, and time. He appears well-developed. He is cooperative.  Non-toxic appearance. He does not appear ill. No distress.   HENT:   Head: Normocephalic and atraumatic.   Ears:   Right Ear: Hearing, tympanic membrane, external ear and ear canal normal.   Left Ear: Hearing, tympanic membrane, external ear and ear canal normal.   Nose: Nose normal. No mucosal edema, rhinorrhea or nasal deformity. No epistaxis. Right sinus exhibits no maxillary sinus tenderness and no frontal sinus tenderness. Left sinus exhibits no maxillary sinus tenderness and no frontal sinus tenderness.   Mouth/Throat: Uvula is midline and mucous membranes are normal. No trismus in the jaw. Normal dentition. No uvula swelling. No oropharyngeal exudate or posterior oropharyngeal erythema.   Eyes: Conjunctivae and lids are normal. Right eye exhibits no discharge. Left eye exhibits no discharge.   Neck: Trachea normal and phonation normal. Neck supple.   Cardiovascular: Normal rate, regular rhythm, normal heart sounds and normal pulses.   Pulmonary/Chest: Effort normal and breath sounds normal. No respiratory distress.   Abdominal: Normal appearance. He exhibits no " pulsatile midline mass.   Musculoskeletal: Normal range of motion.         General: No swelling or tenderness. Normal range of motion.   Neurological: no focal deficit. He is alert and oriented to person, place, and time. He exhibits normal muscle tone. Coordination normal.   Skin: Skin is warm, dry, intact, not diaphoretic and not pale.   Psychiatric: His speech is normal and behavior is normal. Mood, judgment and thought content normal.   Nursing note and vitals reviewed.      Assessment:     1. Acute nasopharyngitis    2. Acute cough        Plan:       Acute nasopharyngitis    Acute cough    Other orders  -     dexAMETHasone injection 8 mg  -     guaiFENesin-codeine 100-10 mg/5 ml (TUSSI-ORGANIDIN NR)  mg/5 mL syrup; Take 10 mLs by mouth every 6 (six) hours as needed for Cough.  Dispense: 118 mL; Refill: 0    Push fluids; tylenol or ibuprofen as needed for fever or discomfort.  F/u with PCP; to ER for worsening of symptoms.    You must understand that you've received an Urgent Care treatment only and that you may be released before all your medical problems are known or treated. You, the patient, will arrange for follow up care as instructed.  If your condition worsens we recommend that you receive another evaluation at the emergency room immediately or contact your primary medical clinics after hours call service to discuss your concerns.  Please return here or go to the Emergency Department for any concerns or worsening of condition.

## 2025-01-08 ENCOUNTER — PATIENT MESSAGE (OUTPATIENT)
Dept: PULMONOLOGY | Facility: CLINIC | Age: 40
End: 2025-01-08
Payer: COMMERCIAL

## 2025-06-29 ENCOUNTER — OFFICE VISIT (OUTPATIENT)
Dept: URGENT CARE | Facility: CLINIC | Age: 40
End: 2025-06-29
Payer: COMMERCIAL

## 2025-06-29 VITALS
RESPIRATION RATE: 18 BRPM | TEMPERATURE: 99 F | DIASTOLIC BLOOD PRESSURE: 74 MMHG | OXYGEN SATURATION: 96 % | HEIGHT: 68 IN | SYSTOLIC BLOOD PRESSURE: 115 MMHG | HEART RATE: 68 BPM | WEIGHT: 210 LBS | BODY MASS INDEX: 31.83 KG/M2

## 2025-06-29 DIAGNOSIS — R51.9 ACUTE NONINTRACTABLE HEADACHE, UNSPECIFIED HEADACHE TYPE: ICD-10-CM

## 2025-06-29 DIAGNOSIS — Z02.89 ENCOUNTER TO OBTAIN EXCUSE FROM WORK: Primary | ICD-10-CM

## 2025-06-29 PROCEDURE — 96372 THER/PROPH/DIAG INJ SC/IM: CPT | Mod: S$GLB,,, | Performed by: NURSE PRACTITIONER

## 2025-06-29 PROCEDURE — 99214 OFFICE O/P EST MOD 30 MIN: CPT | Mod: 25,S$GLB,, | Performed by: NURSE PRACTITIONER

## 2025-06-29 RX ORDER — KETOROLAC TROMETHAMINE 30 MG/ML
30 INJECTION, SOLUTION INTRAMUSCULAR; INTRAVENOUS
Status: COMPLETED | OUTPATIENT
Start: 2025-06-29 | End: 2025-06-29

## 2025-06-29 RX ADMIN — KETOROLAC TROMETHAMINE 30 MG: 30 INJECTION, SOLUTION INTRAMUSCULAR; INTRAVENOUS at 02:06

## 2025-06-29 NOTE — PATIENT INSTRUCTIONS
You were given a Toradol injection for headache today.  Please do not alternate with other NSAIDs such as ibuprofen.  You may alternate with Tylenol  Follow up with primary care  Emergency department for any new onset of weakness, slurred speech, or worsening headache

## 2025-06-29 NOTE — LETTER
June 29, 2025      [facility] [place]  [address]  [address]  [place] [address]  Phone: [phone]  Fax: [phone]       Patient: Trace Bird   YOB: 1985  Date of Visit: 06/29/2025    To Whom It May Concern:    Ani Bird  was at [facility] on 06/29/2025. The patient may return to work/school on 6/30/25 with no restrictions. If you have any questions or concerns, or if I can be of further assistance, please do not hesitate to contact me.    Sincerely,    ONI Katz

## 2025-06-29 NOTE — PROGRESS NOTES
"Subjective:      Patient ID: Trace Bird III is a 39 y.o. male.    Vitals:  height is 5' 8" (1.727 m) and weight is 95.3 kg (210 lb). His oral temperature is 98.7 °F (37.1 °C). His blood pressure is 115/74 and his pulse is 68. His respiration is 18 and oxygen saturation is 96%.     Chief Complaint: Headache    Patient is a 39-year-old male who presents today for headache since last night. States headache is in the back of his head. He has been taking Tylenol and ibuprofen for pain which helped headache last night but states he woke up this morning with the same headache.  He has not taken any medications today.  He denies nausea, photophobia, or weakness this time.  Patient is also requesting a note for work.    Headache   This is a new problem. The current episode started yesterday. The problem occurs intermittently. The problem has been resolved. The pain is located in the Bilateral region. The pain does not radiate. The pain quality is similar to prior headaches. The quality of the pain is described as aching and dull. Pertinent negatives include no abdominal pain, coughing, dizziness, ear pain, fever, nausea, neck pain, sinus pressure, sore throat or vomiting. He has tried Excedrin for the symptoms. The treatment provided moderate relief.       Constitution: Negative for chills, fatigue, fever, unexpected weight change and generalized weakness.   HENT:  Negative for ear pain, ear discharge, congestion, sinus pressure, sore throat and trouble swallowing.    Neck: Negative for neck pain and neck stiffness.   Cardiovascular:  Negative for chest pain and sob on exertion.   Eyes: Negative.    Respiratory:  Negative for chest tightness, cough and shortness of breath.    Gastrointestinal:  Negative for abdominal pain, nausea and vomiting.   Endocrine: negative.   Genitourinary: Negative.    Musculoskeletal:  Negative for pain, trauma and joint pain.   Skin: Negative.    Allergic/Immunologic: Negative.  "   Neurological:  Positive for headaches. Negative for dizziness, disorientation and altered mental status.   Hematologic/Lymphatic: Negative.    Psychiatric/Behavioral:  Negative for altered mental status, disorientation and confusion.       Objective:     Physical Exam   Constitutional: He is oriented to person, place, and time. He appears well-developed.  Non-toxic appearance. He does not appear ill. No distress.   HENT:   Head: Normocephalic and atraumatic.   Ears:   Right Ear: Hearing, external ear and ear canal normal.   Left Ear: Hearing, external ear and ear canal normal.   Nose: Nose normal. No mucosal edema, rhinorrhea, nasal deformity or congestion. No epistaxis. Right sinus exhibits no maxillary sinus tenderness and no frontal sinus tenderness. Left sinus exhibits no maxillary sinus tenderness and no frontal sinus tenderness.   Mouth/Throat: Uvula is midline, oropharynx is clear and moist and mucous membranes are normal. No trismus in the jaw. Normal dentition. No uvula swelling. No oropharyngeal exudate or posterior oropharyngeal erythema.   Eyes: Conjunctivae, EOM and lids are normal. Pupils are equal, round, and reactive to light. No scleral icterus. Extraocular movement intact   Neck: Trachea normal and phonation normal. Neck supple. No neck rigidity present.   Cardiovascular: Normal rate, regular rhythm, normal heart sounds and normal pulses.   Pulmonary/Chest: Effort normal and breath sounds normal. No respiratory distress.   Abdominal: Normal appearance. flat abdomen   Musculoskeletal: Normal range of motion.         General: No deformity. Normal range of motion.   Neurological: no focal deficit. He is alert, oriented to person, place, and time and at baseline. He has normal motor skills, normal sensation and intact cranial nerves (2-12). No cranial nerve deficit. He exhibits normal muscle tone. Gait and coordination normal. Coordination normal. GCS eye subscore is 4. GCS verbal subscore is 5. GCS  motor subscore is 6.   Skin: Skin is warm, dry, intact, not diaphoretic and not pale. Capillary refill takes less than 2 seconds.   Psychiatric: His speech is normal and behavior is normal. Mood, judgment and thought content normal.   Nursing note and vitals reviewed.      Assessment:     1. Encounter to obtain excuse from work    2. Acute nonintractable headache, unspecified headache type        Plan:       Encounter to obtain excuse from work    Acute nonintractable headache, unspecified headache type    Other orders  -     ketorolac injection 30 mg        History reviewed.  Toradol injection given in clinic. Patient tolerated well.  Return precautions and red flags for ER discussed with patient.  Follow up with primary care.  Patient in agreement with plan of care. Work note provided.

## (undated) DEVICE — CATHETER GUIDE LAUNCHER EBU3.5 6X110

## (undated) DEVICE — CATHETER DIAGNOSTIC DXTERITY 5FR JR4.0

## (undated) DEVICE — KIT INFLATION MERIT (IN8152, HP9200E)

## (undated) DEVICE — CATHETER GUIDE LAUNCHER JL4 SH 6FX110

## (undated) DEVICE — GUIDEWIRE J TIP BMW .014X190

## (undated) DEVICE — VALVE BLEEDBACK CONTROL 1003330

## (undated) DEVICE — SHEATH INTRODUCER SLENDER 6FX10CM

## (undated) DEVICE — CATHETER RADIAL TIG 4.0 OPTITORQUE 5X110

## (undated) DEVICE — HEMOSTAT VASC BAND REG 24CM